# Patient Record
Sex: MALE | Race: WHITE | NOT HISPANIC OR LATINO
[De-identification: names, ages, dates, MRNs, and addresses within clinical notes are randomized per-mention and may not be internally consistent; named-entity substitution may affect disease eponyms.]

---

## 2017-02-27 ENCOUNTER — NON-APPOINTMENT (OUTPATIENT)
Age: 57
End: 2017-02-27

## 2017-02-27 ENCOUNTER — APPOINTMENT (OUTPATIENT)
Dept: CARDIOLOGY | Facility: CLINIC | Age: 57
End: 2017-02-27

## 2017-02-27 VITALS
OXYGEN SATURATION: 98 % | HEIGHT: 67 IN | DIASTOLIC BLOOD PRESSURE: 86 MMHG | HEART RATE: 95 BPM | RESPIRATION RATE: 17 BRPM | SYSTOLIC BLOOD PRESSURE: 124 MMHG | WEIGHT: 161 LBS | BODY MASS INDEX: 25.27 KG/M2

## 2017-02-27 VITALS — DIASTOLIC BLOOD PRESSURE: 90 MMHG | SYSTOLIC BLOOD PRESSURE: 120 MMHG

## 2017-05-31 ENCOUNTER — APPOINTMENT (OUTPATIENT)
Dept: CARDIOLOGY | Facility: CLINIC | Age: 57
End: 2017-05-31

## 2017-05-31 ENCOUNTER — NON-APPOINTMENT (OUTPATIENT)
Age: 57
End: 2017-05-31

## 2017-05-31 VITALS
RESPIRATION RATE: 16 BRPM | SYSTOLIC BLOOD PRESSURE: 129 MMHG | OXYGEN SATURATION: 99 % | HEART RATE: 62 BPM | BODY MASS INDEX: 25.43 KG/M2 | DIASTOLIC BLOOD PRESSURE: 85 MMHG | HEIGHT: 67 IN | WEIGHT: 162 LBS

## 2017-09-27 ENCOUNTER — NON-APPOINTMENT (OUTPATIENT)
Age: 57
End: 2017-09-27

## 2017-09-27 ENCOUNTER — APPOINTMENT (OUTPATIENT)
Dept: CARDIOLOGY | Facility: CLINIC | Age: 57
End: 2017-09-27
Payer: COMMERCIAL

## 2017-09-27 VITALS
SYSTOLIC BLOOD PRESSURE: 103 MMHG | RESPIRATION RATE: 17 BRPM | HEART RATE: 86 BPM | WEIGHT: 162 LBS | HEIGHT: 67 IN | OXYGEN SATURATION: 98 % | DIASTOLIC BLOOD PRESSURE: 74 MMHG | BODY MASS INDEX: 25.43 KG/M2

## 2017-09-27 PROCEDURE — 99215 OFFICE O/P EST HI 40 MIN: CPT

## 2017-09-27 PROCEDURE — 93000 ELECTROCARDIOGRAM COMPLETE: CPT

## 2017-09-27 PROCEDURE — 99214 OFFICE O/P EST MOD 30 MIN: CPT

## 2017-10-23 ENCOUNTER — OUTPATIENT (OUTPATIENT)
Dept: OUTPATIENT SERVICES | Facility: HOSPITAL | Age: 57
LOS: 1 days | Discharge: HOME | End: 2017-10-23
Payer: COMMERCIAL

## 2017-10-23 VITALS
OXYGEN SATURATION: 100 % | HEIGHT: 67 IN | TEMPERATURE: 98 F | RESPIRATION RATE: 16 BRPM | WEIGHT: 160.06 LBS | HEART RATE: 67 BPM | DIASTOLIC BLOOD PRESSURE: 88 MMHG | SYSTOLIC BLOOD PRESSURE: 130 MMHG

## 2017-10-23 DIAGNOSIS — Z95.2 PRESENCE OF PROSTHETIC HEART VALVE: Chronic | ICD-10-CM

## 2017-10-23 DIAGNOSIS — I35.0 NONRHEUMATIC AORTIC (VALVE) STENOSIS: ICD-10-CM

## 2017-10-23 LAB
ALBUMIN SERPL ELPH-MCNC: 4.5 G/DL — SIGNIFICANT CHANGE UP (ref 3.3–5)
ALP SERPL-CCNC: 79 U/L — SIGNIFICANT CHANGE UP (ref 40–120)
ALT FLD-CCNC: 35 U/L RC — SIGNIFICANT CHANGE UP (ref 10–45)
ANION GAP SERPL CALC-SCNC: 12 MMOL/L — SIGNIFICANT CHANGE UP (ref 5–17)
AST SERPL-CCNC: 23 U/L — SIGNIFICANT CHANGE UP (ref 10–40)
BILIRUB SERPL-MCNC: 0.7 MG/DL — SIGNIFICANT CHANGE UP (ref 0.2–1.2)
BUN SERPL-MCNC: 21 MG/DL — SIGNIFICANT CHANGE UP (ref 7–23)
CALCIUM SERPL-MCNC: 9 MG/DL — SIGNIFICANT CHANGE UP (ref 8.4–10.5)
CHLORIDE SERPL-SCNC: 103 MMOL/L — SIGNIFICANT CHANGE UP (ref 96–108)
CO2 SERPL-SCNC: 29 MMOL/L — SIGNIFICANT CHANGE UP (ref 22–31)
CREAT SERPL-MCNC: 1.25 MG/DL — SIGNIFICANT CHANGE UP (ref 0.5–1.3)
GLUCOSE SERPL-MCNC: 105 MG/DL — HIGH (ref 70–99)
HCT VFR BLD CALC: 42.4 % — SIGNIFICANT CHANGE UP (ref 39–50)
HGB BLD-MCNC: 14.6 G/DL — SIGNIFICANT CHANGE UP (ref 13–17)
MCHC RBC-ENTMCNC: 32.8 PG — SIGNIFICANT CHANGE UP (ref 27–34)
MCHC RBC-ENTMCNC: 34.4 GM/DL — SIGNIFICANT CHANGE UP (ref 32–36)
MCV RBC AUTO: 95.5 FL — SIGNIFICANT CHANGE UP (ref 80–100)
PLATELET # BLD AUTO: 259 K/UL — SIGNIFICANT CHANGE UP (ref 150–400)
POTASSIUM SERPL-MCNC: 4.6 MMOL/L — SIGNIFICANT CHANGE UP (ref 3.5–5.3)
POTASSIUM SERPL-SCNC: 4.6 MMOL/L — SIGNIFICANT CHANGE UP (ref 3.5–5.3)
PROT SERPL-MCNC: 7.3 G/DL — SIGNIFICANT CHANGE UP (ref 6–8.3)
RBC # BLD: 4.45 M/UL — SIGNIFICANT CHANGE UP (ref 4.2–5.8)
RBC # FLD: 11 % — SIGNIFICANT CHANGE UP (ref 10.3–14.5)
SODIUM SERPL-SCNC: 144 MMOL/L — SIGNIFICANT CHANGE UP (ref 135–145)
WBC # BLD: 6.8 K/UL — SIGNIFICANT CHANGE UP (ref 3.8–10.5)
WBC # FLD AUTO: 6.8 K/UL — SIGNIFICANT CHANGE UP (ref 3.8–10.5)

## 2017-10-23 PROCEDURE — C1887: CPT

## 2017-10-23 PROCEDURE — 93461 R&L HRT ART/VENTRICLE ANGIO: CPT

## 2017-10-23 PROCEDURE — 93567 NJX CAR CTH SPRVLV AORTGRPHY: CPT

## 2017-10-23 PROCEDURE — 93010 ELECTROCARDIOGRAM REPORT: CPT

## 2017-10-23 PROCEDURE — C1769: CPT

## 2017-10-23 PROCEDURE — 93460 R&L HRT ART/VENTRICLE ANGIO: CPT | Mod: 26

## 2017-10-23 PROCEDURE — 93460 R&L HRT ART/VENTRICLE ANGIO: CPT

## 2017-10-23 PROCEDURE — C1894: CPT

## 2017-10-23 PROCEDURE — 93005 ELECTROCARDIOGRAM TRACING: CPT

## 2017-10-23 PROCEDURE — 85027 COMPLETE CBC AUTOMATED: CPT

## 2017-10-23 PROCEDURE — 80053 COMPREHEN METABOLIC PANEL: CPT

## 2017-10-23 RX ORDER — SODIUM CHLORIDE 9 MG/ML
3 INJECTION INTRAMUSCULAR; INTRAVENOUS; SUBCUTANEOUS EVERY 8 HOURS
Qty: 0 | Refills: 0 | Status: DISCONTINUED | OUTPATIENT
Start: 2017-10-23 | End: 2017-11-10

## 2017-10-23 NOTE — H&P CARDIOLOGY - HISTORY OF PRESENT ILLNESS
57 yr old with PMH of HTN, HLD and known AS presents today for R+L heart cath. Patient reports LOCK for the past 2 years. Had follow up w Dr Sanon where ECHO was performed showing 57 yr old with PMH of HTN, HLD and known AS presents today for R+L heart cath. Patient reports LOCK for the past 2 years. No dizziness or chest pain.  Had follow up w Dr Sanon where ECHO was performed showing moderate to severe AS (as reported by pt). Referred today for angiogram

## 2017-10-30 ENCOUNTER — APPOINTMENT (OUTPATIENT)
Dept: CARDIOLOGY | Facility: CLINIC | Age: 57
End: 2017-10-30

## 2017-11-06 ENCOUNTER — APPOINTMENT (OUTPATIENT)
Dept: CARDIOLOGY | Facility: CLINIC | Age: 57
End: 2017-11-06
Payer: COMMERCIAL

## 2017-11-06 ENCOUNTER — NON-APPOINTMENT (OUTPATIENT)
Age: 57
End: 2017-11-06

## 2017-11-06 ENCOUNTER — APPOINTMENT (OUTPATIENT)
Dept: CARDIOTHORACIC SURGERY | Facility: CLINIC | Age: 57
End: 2017-11-06
Payer: COMMERCIAL

## 2017-11-06 VITALS
BODY MASS INDEX: 25.11 KG/M2 | RESPIRATION RATE: 17 BRPM | OXYGEN SATURATION: 93 % | HEART RATE: 83 BPM | HEIGHT: 67 IN | DIASTOLIC BLOOD PRESSURE: 77 MMHG | SYSTOLIC BLOOD PRESSURE: 107 MMHG | WEIGHT: 160 LBS

## 2017-11-06 DIAGNOSIS — R42 DIZZINESS AND GIDDINESS: ICD-10-CM

## 2017-11-06 PROCEDURE — 93000 ELECTROCARDIOGRAM COMPLETE: CPT

## 2017-11-06 PROCEDURE — 99215 OFFICE O/P EST HI 40 MIN: CPT

## 2017-11-14 ENCOUNTER — APPOINTMENT (OUTPATIENT)
Dept: CARDIOTHORACIC SURGERY | Facility: CLINIC | Age: 57
End: 2017-11-14
Payer: COMMERCIAL

## 2017-11-14 VITALS — DIASTOLIC BLOOD PRESSURE: 83 MMHG | SYSTOLIC BLOOD PRESSURE: 126 MMHG

## 2017-11-14 VITALS
DIASTOLIC BLOOD PRESSURE: 78 MMHG | RESPIRATION RATE: 15 BRPM | HEIGHT: 67 IN | SYSTOLIC BLOOD PRESSURE: 126 MMHG | WEIGHT: 162 LBS | OXYGEN SATURATION: 98 % | BODY MASS INDEX: 25.43 KG/M2 | TEMPERATURE: 98 F | HEART RATE: 71 BPM

## 2017-11-14 DIAGNOSIS — R01.1 CARDIAC MURMUR, UNSPECIFIED: ICD-10-CM

## 2017-11-14 DIAGNOSIS — Z71.9 COUNSELING, UNSPECIFIED: ICD-10-CM

## 2017-11-14 DIAGNOSIS — E78.5 HYPERLIPIDEMIA, UNSPECIFIED: ICD-10-CM

## 2017-11-14 PROCEDURE — 99244 OFF/OP CNSLTJ NEW/EST MOD 40: CPT

## 2017-11-14 RX ORDER — AMLODIPINE BESYLATE 2.5 MG/1
2.5 TABLET ORAL
Refills: 0 | Status: COMPLETED | COMMUNITY
End: 2017-11-14

## 2017-11-15 ENCOUNTER — APPOINTMENT (OUTPATIENT)
Dept: CARDIOTHORACIC SURGERY | Facility: CLINIC | Age: 57
End: 2017-11-15
Payer: COMMERCIAL

## 2018-01-03 ENCOUNTER — OUTPATIENT (OUTPATIENT)
Dept: OUTPATIENT SERVICES | Facility: HOSPITAL | Age: 58
LOS: 1 days | End: 2018-01-03
Payer: COMMERCIAL

## 2018-01-03 VITALS
HEIGHT: 67 IN | RESPIRATION RATE: 16 BRPM | TEMPERATURE: 98 F | SYSTOLIC BLOOD PRESSURE: 130 MMHG | OXYGEN SATURATION: 97 % | HEART RATE: 80 BPM | WEIGHT: 163.58 LBS | DIASTOLIC BLOOD PRESSURE: 90 MMHG

## 2018-01-03 DIAGNOSIS — Z01.818 ENCOUNTER FOR OTHER PREPROCEDURAL EXAMINATION: ICD-10-CM

## 2018-01-03 DIAGNOSIS — I10 ESSENTIAL (PRIMARY) HYPERTENSION: ICD-10-CM

## 2018-01-03 DIAGNOSIS — I35.0 NONRHEUMATIC AORTIC (VALVE) STENOSIS: ICD-10-CM

## 2018-01-03 DIAGNOSIS — Z95.2 PRESENCE OF PROSTHETIC HEART VALVE: Chronic | ICD-10-CM

## 2018-01-03 LAB
ANION GAP SERPL CALC-SCNC: 22 MMOL/L — HIGH (ref 5–17)
BLD GP AB SCN SERPL QL: NEGATIVE — SIGNIFICANT CHANGE UP
BUN SERPL-MCNC: 20 MG/DL — SIGNIFICANT CHANGE UP (ref 7–23)
CALCIUM SERPL-MCNC: 10.2 MG/DL — SIGNIFICANT CHANGE UP (ref 8.4–10.5)
CHLORIDE SERPL-SCNC: 104 MMOL/L — SIGNIFICANT CHANGE UP (ref 96–108)
CO2 SERPL-SCNC: 21 MMOL/L — LOW (ref 22–31)
CREAT SERPL-MCNC: 1.25 MG/DL — SIGNIFICANT CHANGE UP (ref 0.5–1.3)
GLUCOSE SERPL-MCNC: 107 MG/DL — HIGH (ref 70–99)
HBA1C BLD-MCNC: 5.3 % — SIGNIFICANT CHANGE UP (ref 4–5.6)
HCT VFR BLD CALC: 48 % — SIGNIFICANT CHANGE UP (ref 39–50)
HGB BLD-MCNC: 15.3 G/DL — SIGNIFICANT CHANGE UP (ref 13–17)
MCHC RBC-ENTMCNC: 30.4 PG — SIGNIFICANT CHANGE UP (ref 27–34)
MCHC RBC-ENTMCNC: 31.9 GM/DL — LOW (ref 32–36)
MCV RBC AUTO: 95.4 FL — SIGNIFICANT CHANGE UP (ref 80–100)
MRSA PCR RESULT.: SIGNIFICANT CHANGE UP
PLATELET # BLD AUTO: 294 K/UL — SIGNIFICANT CHANGE UP (ref 150–400)
POTASSIUM SERPL-MCNC: 4.6 MMOL/L — SIGNIFICANT CHANGE UP (ref 3.5–5.3)
POTASSIUM SERPL-SCNC: 4.6 MMOL/L — SIGNIFICANT CHANGE UP (ref 3.5–5.3)
RBC # BLD: 5.03 M/UL — SIGNIFICANT CHANGE UP (ref 4.2–5.8)
RBC # FLD: 12.4 % — SIGNIFICANT CHANGE UP (ref 10.3–14.5)
RH IG SCN BLD-IMP: POSITIVE — SIGNIFICANT CHANGE UP
S AUREUS DNA NOSE QL NAA+PROBE: DETECTED
SODIUM SERPL-SCNC: 147 MMOL/L — HIGH (ref 135–145)
WBC # BLD: 6.37 K/UL — SIGNIFICANT CHANGE UP (ref 3.8–10.5)
WBC # FLD AUTO: 6.37 K/UL — SIGNIFICANT CHANGE UP (ref 3.8–10.5)

## 2018-01-03 PROCEDURE — 71046 X-RAY EXAM CHEST 2 VIEWS: CPT | Mod: 26

## 2018-01-03 PROCEDURE — 86850 RBC ANTIBODY SCREEN: CPT

## 2018-01-03 PROCEDURE — 86901 BLOOD TYPING SEROLOGIC RH(D): CPT

## 2018-01-03 PROCEDURE — 87641 MR-STAPH DNA AMP PROBE: CPT

## 2018-01-03 PROCEDURE — 86900 BLOOD TYPING SEROLOGIC ABO: CPT

## 2018-01-03 PROCEDURE — 83036 HEMOGLOBIN GLYCOSYLATED A1C: CPT

## 2018-01-03 PROCEDURE — 71046 X-RAY EXAM CHEST 2 VIEWS: CPT

## 2018-01-03 PROCEDURE — 80048 BASIC METABOLIC PNL TOTAL CA: CPT

## 2018-01-03 PROCEDURE — 85027 COMPLETE CBC AUTOMATED: CPT

## 2018-01-03 PROCEDURE — 87640 STAPH A DNA AMP PROBE: CPT

## 2018-01-03 PROCEDURE — G0463: CPT

## 2018-01-03 RX ORDER — CEFUROXIME AXETIL 250 MG
1500 TABLET ORAL ONCE
Qty: 0 | Refills: 0 | Status: COMPLETED | OUTPATIENT
Start: 2018-01-18 | End: 2018-01-18

## 2018-01-03 RX ORDER — AMLODIPINE BESYLATE 2.5 MG/1
1 TABLET ORAL
Qty: 0 | Refills: 0 | COMMUNITY

## 2018-01-03 RX ORDER — SODIUM CHLORIDE 9 MG/ML
3 INJECTION INTRAMUSCULAR; INTRAVENOUS; SUBCUTANEOUS EVERY 8 HOURS
Qty: 0 | Refills: 0 | Status: DISCONTINUED | OUTPATIENT
Start: 2018-01-18 | End: 2018-01-18

## 2018-01-03 NOTE — H&P PST ADULT - HISTORY OF PRESENT ILLNESS
57 yr old with PMH of HTN, HLD and known AS presents today for R+L heart cath. Patient reports LOCK for the past 2 years. No dizziness or chest pain.  Had follow up w Dr Sanon where ECHO was performed showing moderate to severe AS (as reported by pt). Referred today for angiogram 58 yo male, PMH heart murmur for several years, last Echocardiogram revealed moderate to severe AS, pt. presents to PST today for scheduled Aortic Valve Replacement.   57 yr old with PMH of HTN, HLD and known AS presents today for R+L heart cath. Patient reports LOCK for the past 2 years. No dizziness or chest pain.  Had follow up w Dr Sanon where ECHO was performed showing moderate to severe AS (as reported by pt). Referred today for angiogram 56 yo male, PMH heart murmur for several years, last Echocardiogram revealed calcified aortic valve with decreased opening, pt. presents to PST today for scheduled Aortic Valve Replacement on 1/11/18. Pt. denies SOB, LOCK, chest pain, palpitations, changes in bowel/urinary habits.

## 2018-01-03 NOTE — H&P PST ADULT - PROBLEM SELECTOR PLAN 1
Aortic Valve Replacement  Pre-op education, including Chlorhexidine soap, provided, all questions answered

## 2018-01-03 NOTE — H&P PST ADULT - NSANTHOSAYNRD_GEN_A_CORE
No. RONDA screening performed.  STOP BANG Legend: 0-2 = LOW Risk; 3-4 = INTERMEDIATE Risk; 5-8 = HIGH Risk

## 2018-01-03 NOTE — H&P PST ADULT - PMH
AS (aortic stenosis)    HLD (hyperlipidemia)    HTN (hypertension) AS (aortic stenosis)  severe  HTN (hypertension) AS (aortic stenosis)  mod-severe  HTN (hypertension)

## 2018-01-08 ENCOUNTER — TRANSCRIPTION ENCOUNTER (OUTPATIENT)
Age: 58
End: 2018-01-08

## 2018-01-18 ENCOUNTER — APPOINTMENT (OUTPATIENT)
Dept: CARDIOTHORACIC SURGERY | Facility: HOSPITAL | Age: 58
End: 2018-01-18
Payer: COMMERCIAL

## 2018-01-18 ENCOUNTER — INPATIENT (INPATIENT)
Facility: HOSPITAL | Age: 58
LOS: 3 days | Discharge: HOME | DRG: 219 | End: 2018-01-22
Attending: THORACIC SURGERY (CARDIOTHORACIC VASCULAR SURGERY) | Admitting: THORACIC SURGERY (CARDIOTHORACIC VASCULAR SURGERY)
Payer: COMMERCIAL

## 2018-01-18 ENCOUNTER — RESULT REVIEW (OUTPATIENT)
Age: 58
End: 2018-01-18

## 2018-01-18 VITALS
WEIGHT: 160.28 LBS | OXYGEN SATURATION: 98 % | TEMPERATURE: 98 F | DIASTOLIC BLOOD PRESSURE: 84 MMHG | HEIGHT: 67 IN | HEART RATE: 86 BPM | RESPIRATION RATE: 18 BRPM | SYSTOLIC BLOOD PRESSURE: 131 MMHG

## 2018-01-18 DIAGNOSIS — Z95.2 PRESENCE OF PROSTHETIC HEART VALVE: Chronic | ICD-10-CM

## 2018-01-18 DIAGNOSIS — I35.0 NONRHEUMATIC AORTIC (VALVE) STENOSIS: ICD-10-CM

## 2018-01-18 LAB
ANION GAP SERPL CALC-SCNC: 13 MMOL/L — SIGNIFICANT CHANGE UP (ref 5–17)
APTT BLD: 26.8 SEC — LOW (ref 27.5–37.4)
BASE EXCESS BLDV CALC-SCNC: 0.5 MMOL/L — SIGNIFICANT CHANGE UP (ref -2–2)
BASE EXCESS BLDV CALC-SCNC: 0.7 MMOL/L — SIGNIFICANT CHANGE UP (ref -2–2)
BASOPHILS # BLD AUTO: 0.1 K/UL — SIGNIFICANT CHANGE UP (ref 0–0.2)
BASOPHILS NFR BLD AUTO: 0.4 % — SIGNIFICANT CHANGE UP (ref 0–2)
BLOOD GAS VENOUS - CREATININE: SIGNIFICANT CHANGE UP MG/DL (ref 0.5–1.3)
BLOOD GAS VENOUS - CREATININE: SIGNIFICANT CHANGE UP MG/DL (ref 0.5–1.3)
BUN SERPL-MCNC: 16 MG/DL — SIGNIFICANT CHANGE UP (ref 7–23)
CA-I SERPL-SCNC: 0.9 MMOL/L — LOW (ref 1.12–1.3)
CA-I SERPL-SCNC: 0.93 MMOL/L — LOW (ref 1.12–1.3)
CALCIUM SERPL-MCNC: 7.6 MG/DL — LOW (ref 8.4–10.5)
CHLORIDE BLDV-SCNC: SIGNIFICANT CHANGE UP MMOL/L (ref 96–108)
CHLORIDE BLDV-SCNC: SIGNIFICANT CHANGE UP MMOL/L (ref 96–108)
CHLORIDE SERPL-SCNC: 105 MMOL/L — SIGNIFICANT CHANGE UP (ref 96–108)
CK MB BLD-MCNC: 13.7 % — HIGH (ref 0–3.5)
CK MB CFR SERPL CALC: 45 NG/ML — HIGH (ref 0–6.7)
CK SERPL-CCNC: 329 U/L — HIGH (ref 30–200)
CO2 SERPL-SCNC: 23 MMOL/L — SIGNIFICANT CHANGE UP (ref 22–31)
CREAT SERPL-MCNC: 1.01 MG/DL — SIGNIFICANT CHANGE UP (ref 0.5–1.3)
EOSINOPHIL # BLD AUTO: 0.2 K/UL — SIGNIFICANT CHANGE UP (ref 0–0.5)
EOSINOPHIL NFR BLD AUTO: 1.2 % — SIGNIFICANT CHANGE UP (ref 0–6)
GAS PNL BLDA: SIGNIFICANT CHANGE UP
GAS PNL BLDV: 137 MMOL/L — SIGNIFICANT CHANGE UP (ref 136–145)
GAS PNL BLDV: 137 MMOL/L — SIGNIFICANT CHANGE UP (ref 136–145)
GAS PNL BLDV: SIGNIFICANT CHANGE UP
GLUCOSE BLDV-MCNC: 121 MG/DL — HIGH (ref 70–99)
GLUCOSE BLDV-MCNC: 129 MG/DL — HIGH (ref 70–99)
GLUCOSE SERPL-MCNC: 129 MG/DL — HIGH (ref 70–99)
HCO3 BLDV-SCNC: 25 MMOL/L — SIGNIFICANT CHANGE UP (ref 21–29)
HCO3 BLDV-SCNC: 25 MMOL/L — SIGNIFICANT CHANGE UP (ref 21–29)
HCT VFR BLD CALC: 36.3 % — LOW (ref 39–50)
HCT VFR BLDA CALC: 26 % — LOW (ref 39–50)
HCT VFR BLDA CALC: 29 % — LOW (ref 39–50)
HGB BLD CALC-MCNC: 8.2 G/DL — LOW (ref 13–17)
HGB BLD CALC-MCNC: 9.3 G/DL — LOW (ref 13–17)
HGB BLD-MCNC: 12.7 G/DL — LOW (ref 13–17)
HOROWITZ INDEX BLDV+IHG-RTO: 0 — SIGNIFICANT CHANGE UP
HOROWITZ INDEX BLDV+IHG-RTO: 0 — SIGNIFICANT CHANGE UP
INR BLD: 1.18 RATIO — HIGH (ref 0.88–1.16)
LACTATE BLDV-MCNC: 1.3 MMOL/L — SIGNIFICANT CHANGE UP (ref 0.7–2)
LACTATE BLDV-MCNC: 1.8 MMOL/L — SIGNIFICANT CHANGE UP (ref 0.7–2)
LYMPHOCYTES # BLD AUTO: 1.9 K/UL — SIGNIFICANT CHANGE UP (ref 1–3.3)
LYMPHOCYTES # BLD AUTO: 13.8 % — SIGNIFICANT CHANGE UP (ref 13–44)
MCHC RBC-ENTMCNC: 32.4 PG — SIGNIFICANT CHANGE UP (ref 27–34)
MCHC RBC-ENTMCNC: 35 GM/DL — SIGNIFICANT CHANGE UP (ref 32–36)
MCV RBC AUTO: 92.7 FL — SIGNIFICANT CHANGE UP (ref 80–100)
MONOCYTES # BLD AUTO: 0.7 K/UL — SIGNIFICANT CHANGE UP (ref 0–0.9)
MONOCYTES NFR BLD AUTO: 5 % — SIGNIFICANT CHANGE UP (ref 2–14)
NEUTROPHILS # BLD AUTO: 11.2 K/UL — HIGH (ref 1.8–7.4)
NEUTROPHILS NFR BLD AUTO: 79.7 % — HIGH (ref 43–77)
PCO2 BLDV: 42 MMHG — SIGNIFICANT CHANGE UP (ref 35–50)
PCO2 BLDV: 42 MMHG — SIGNIFICANT CHANGE UP (ref 35–50)
PH BLDV: 7.39 — SIGNIFICANT CHANGE UP (ref 7.35–7.45)
PH BLDV: 7.39 — SIGNIFICANT CHANGE UP (ref 7.35–7.45)
PLATELET # BLD AUTO: 144 K/UL — LOW (ref 150–400)
PO2 BLDV: 75 MMHG — HIGH (ref 25–45)
PO2 BLDV: 78 MMHG — HIGH (ref 25–45)
POTASSIUM BLDV-SCNC: 6.1 MMOL/L — HIGH (ref 3.5–5)
POTASSIUM BLDV-SCNC: 6.2 MMOL/L — CRITICAL HIGH (ref 3.5–5)
POTASSIUM SERPL-MCNC: 4.2 MMOL/L — SIGNIFICANT CHANGE UP (ref 3.5–5.3)
POTASSIUM SERPL-SCNC: 4.2 MMOL/L — SIGNIFICANT CHANGE UP (ref 3.5–5.3)
PROTHROM AB SERPL-ACNC: 12.9 SEC — HIGH (ref 9.8–12.7)
RBC # BLD: 3.92 M/UL — LOW (ref 4.2–5.8)
RBC # FLD: 10.9 % — SIGNIFICANT CHANGE UP (ref 10.3–14.5)
RH IG SCN BLD-IMP: POSITIVE — SIGNIFICANT CHANGE UP
SAO2 % BLDV: 96 % — HIGH (ref 67–88)
SAO2 % BLDV: 96 % — HIGH (ref 67–88)
SODIUM SERPL-SCNC: 141 MMOL/L — SIGNIFICANT CHANGE UP (ref 135–145)
TROPONIN T SERPL-MCNC: 0.61 NG/ML — HIGH (ref 0–0.06)
WBC # BLD: 14 K/UL — HIGH (ref 3.8–10.5)
WBC # FLD AUTO: 14 K/UL — HIGH (ref 3.8–10.5)

## 2018-01-18 PROCEDURE — 88311 DECALCIFY TISSUE: CPT | Mod: 26

## 2018-01-18 PROCEDURE — 33405 REPLACEMENT AORTIC VALVE OPN: CPT | Mod: AS

## 2018-01-18 PROCEDURE — 71045 X-RAY EXAM CHEST 1 VIEW: CPT | Mod: 26

## 2018-01-18 PROCEDURE — 33405 REPLACEMENT AORTIC VALVE OPN: CPT

## 2018-01-18 PROCEDURE — 88305 TISSUE EXAM BY PATHOLOGIST: CPT | Mod: 26

## 2018-01-18 PROCEDURE — 93010 ELECTROCARDIOGRAM REPORT: CPT

## 2018-01-18 RX ORDER — DEXTROSE 50 % IN WATER 50 %
25 SYRINGE (ML) INTRAVENOUS
Qty: 0 | Refills: 0 | Status: DISCONTINUED | OUTPATIENT
Start: 2018-01-18 | End: 2018-01-21

## 2018-01-18 RX ORDER — ACETAMINOPHEN 500 MG
1000 TABLET ORAL ONCE
Qty: 0 | Refills: 0 | Status: COMPLETED | OUTPATIENT
Start: 2018-01-18 | End: 2018-01-18

## 2018-01-18 RX ORDER — LIDOCAINE HCL 20 MG/ML
0.2 VIAL (ML) INJECTION ONCE
Qty: 0 | Refills: 0 | Status: COMPLETED | OUTPATIENT
Start: 2018-01-18 | End: 2018-01-18

## 2018-01-18 RX ORDER — POTASSIUM CHLORIDE 20 MEQ
10 PACKET (EA) ORAL
Qty: 0 | Refills: 0 | Status: COMPLETED | OUTPATIENT
Start: 2018-01-18 | End: 2018-01-18

## 2018-01-18 RX ORDER — SODIUM CHLORIDE 9 MG/ML
750 INJECTION, SOLUTION INTRAVENOUS
Qty: 0 | Refills: 0 | Status: COMPLETED | OUTPATIENT
Start: 2018-01-18 | End: 2018-01-18

## 2018-01-18 RX ORDER — CEFUROXIME AXETIL 250 MG
1500 TABLET ORAL EVERY 8 HOURS
Qty: 0 | Refills: 0 | Status: COMPLETED | OUTPATIENT
Start: 2018-01-18 | End: 2018-01-20

## 2018-01-18 RX ORDER — POTASSIUM CHLORIDE 20 MEQ
10 PACKET (EA) ORAL
Qty: 0 | Refills: 0 | Status: DISCONTINUED | OUTPATIENT
Start: 2018-01-18 | End: 2018-01-19

## 2018-01-18 RX ORDER — VANCOMYCIN HCL 1 G
1000 VIAL (EA) INTRAVENOUS ONCE
Qty: 0 | Refills: 0 | Status: COMPLETED | OUTPATIENT
Start: 2018-01-18 | End: 2018-01-18

## 2018-01-18 RX ORDER — DEXMEDETOMIDINE HYDROCHLORIDE IN 0.9% SODIUM CHLORIDE 4 UG/ML
0.2 INJECTION INTRAVENOUS
Qty: 200 | Refills: 0 | Status: DISCONTINUED | OUTPATIENT
Start: 2018-01-18 | End: 2018-01-19

## 2018-01-18 RX ORDER — POTASSIUM CHLORIDE 20 MEQ
10 PACKET (EA) ORAL
Qty: 0 | Refills: 0 | Status: COMPLETED | OUTPATIENT
Start: 2018-01-18 | End: 2018-01-19

## 2018-01-18 RX ORDER — DOCUSATE SODIUM 100 MG
100 CAPSULE ORAL THREE TIMES A DAY
Qty: 0 | Refills: 0 | Status: DISCONTINUED | OUTPATIENT
Start: 2018-01-18 | End: 2018-01-22

## 2018-01-18 RX ORDER — INSULIN HUMAN 100 [IU]/ML
3 INJECTION, SOLUTION SUBCUTANEOUS
Qty: 100 | Refills: 0 | Status: DISCONTINUED | OUTPATIENT
Start: 2018-01-18 | End: 2018-01-19

## 2018-01-18 RX ORDER — POTASSIUM CHLORIDE 20 MEQ
10 PACKET (EA) ORAL ONCE
Qty: 0 | Refills: 0 | Status: DISCONTINUED | OUTPATIENT
Start: 2018-01-18 | End: 2018-01-19

## 2018-01-18 RX ORDER — NOREPINEPHRINE BITARTRATE/D5W 8 MG/250ML
0.05 PLASTIC BAG, INJECTION (ML) INTRAVENOUS
Qty: 8 | Refills: 0 | Status: DISCONTINUED | OUTPATIENT
Start: 2018-01-18 | End: 2018-01-19

## 2018-01-18 RX ORDER — VASOPRESSIN 20 [USP'U]/ML
0.04 INJECTION INTRAVENOUS
Qty: 100 | Refills: 0 | Status: DISCONTINUED | OUTPATIENT
Start: 2018-01-18 | End: 2018-01-20

## 2018-01-18 RX ORDER — METOCLOPRAMIDE HCL 10 MG
10 TABLET ORAL EVERY 8 HOURS
Qty: 0 | Refills: 0 | Status: COMPLETED | OUTPATIENT
Start: 2018-01-18 | End: 2018-01-20

## 2018-01-18 RX ORDER — DEXTROSE 50 % IN WATER 50 %
50 SYRINGE (ML) INTRAVENOUS
Qty: 0 | Refills: 0 | Status: DISCONTINUED | OUTPATIENT
Start: 2018-01-18 | End: 2018-01-21

## 2018-01-18 RX ORDER — PROPOFOL 10 MG/ML
11.46 INJECTION, EMULSION INTRAVENOUS
Qty: 500 | Refills: 0 | Status: DISCONTINUED | OUTPATIENT
Start: 2018-01-18 | End: 2018-01-19

## 2018-01-18 RX ORDER — SODIUM CHLORIDE 9 MG/ML
1000 INJECTION, SOLUTION INTRAVENOUS
Qty: 0 | Refills: 0 | Status: DISCONTINUED | OUTPATIENT
Start: 2018-01-18 | End: 2018-01-19

## 2018-01-18 RX ORDER — MEPERIDINE HYDROCHLORIDE 50 MG/ML
25 INJECTION INTRAMUSCULAR; INTRAVENOUS; SUBCUTANEOUS ONCE
Qty: 0 | Refills: 0 | Status: DISCONTINUED | OUTPATIENT
Start: 2018-01-18 | End: 2018-01-18

## 2018-01-18 RX ORDER — PANTOPRAZOLE SODIUM 20 MG/1
40 TABLET, DELAYED RELEASE ORAL DAILY
Qty: 0 | Refills: 0 | Status: DISCONTINUED | OUTPATIENT
Start: 2018-01-18 | End: 2018-01-19

## 2018-01-18 RX ORDER — ALBUMIN HUMAN 25 %
250 VIAL (ML) INTRAVENOUS ONCE
Qty: 0 | Refills: 0 | Status: COMPLETED | OUTPATIENT
Start: 2018-01-18 | End: 2018-01-18

## 2018-01-18 RX ORDER — HYDROMORPHONE HYDROCHLORIDE 2 MG/ML
0.5 INJECTION INTRAMUSCULAR; INTRAVENOUS; SUBCUTANEOUS ONCE
Qty: 0 | Refills: 0 | Status: DISCONTINUED | OUTPATIENT
Start: 2018-01-18 | End: 2018-01-18

## 2018-01-18 RX ADMIN — Medication 100 MILLIEQUIVALENT(S): at 22:39

## 2018-01-18 RX ADMIN — Medication 10 MILLIGRAM(S): at 21:21

## 2018-01-18 RX ADMIN — PANTOPRAZOLE SODIUM 40 MILLIGRAM(S): 20 TABLET, DELAYED RELEASE ORAL at 15:01

## 2018-01-18 RX ADMIN — Medication 125 MILLILITER(S): at 20:30

## 2018-01-18 RX ADMIN — Medication 100 MILLIGRAM(S): at 15:01

## 2018-01-18 RX ADMIN — Medication 100 MILLIEQUIVALENT(S): at 14:30

## 2018-01-18 RX ADMIN — Medication 0.2 MILLILITER(S): at 06:15

## 2018-01-18 RX ADMIN — Medication 100 MILLIEQUIVALENT(S): at 14:00

## 2018-01-18 RX ADMIN — HYDROMORPHONE HYDROCHLORIDE 0.5 MILLIGRAM(S): 2 INJECTION INTRAMUSCULAR; INTRAVENOUS; SUBCUTANEOUS at 16:45

## 2018-01-18 RX ADMIN — HYDROMORPHONE HYDROCHLORIDE 0.5 MILLIGRAM(S): 2 INJECTION INTRAMUSCULAR; INTRAVENOUS; SUBCUTANEOUS at 17:00

## 2018-01-18 RX ADMIN — Medication 400 MILLIGRAM(S): at 19:01

## 2018-01-18 RX ADMIN — Medication 250 MILLIGRAM(S): at 19:15

## 2018-01-18 RX ADMIN — Medication 10 MILLIGRAM(S): at 15:01

## 2018-01-18 RX ADMIN — SODIUM CHLORIDE 4500 MILLILITER(S): 9 INJECTION, SOLUTION INTRAVENOUS at 14:15

## 2018-01-18 NOTE — BRIEF OPERATIVE NOTE - POST-OP DX
Aortic valve insufficiency, etiology of cardiac valve disease unspecified  01/18/2018    Active  Alex Brandon

## 2018-01-18 NOTE — BRIEF OPERATIVE NOTE - PROCEDURE
<<-----Click on this checkbox to enter Procedure Aortic valve replacement  01/18/2018  Pericardial valve #  [facility]  [facility]

## 2018-01-19 LAB
ANION GAP SERPL CALC-SCNC: 8 MMOL/L — SIGNIFICANT CHANGE UP (ref 5–17)
BASE EXCESS BLDV CALC-SCNC: -1.9 MMOL/L — SIGNIFICANT CHANGE UP (ref -2–2)
BASOPHILS # BLD AUTO: 0 K/UL — SIGNIFICANT CHANGE UP (ref 0–0.2)
BASOPHILS NFR BLD AUTO: 0.3 % — SIGNIFICANT CHANGE UP (ref 0–2)
BUN SERPL-MCNC: 12 MG/DL — SIGNIFICANT CHANGE UP (ref 7–23)
CA-I SERPL-SCNC: 1.12 MMOL/L — SIGNIFICANT CHANGE UP (ref 1.12–1.3)
CALCIUM SERPL-MCNC: 7.5 MG/DL — LOW (ref 8.4–10.5)
CHLORIDE BLDV-SCNC: 109 MMOL/L — HIGH (ref 96–108)
CHLORIDE SERPL-SCNC: 108 MMOL/L — SIGNIFICANT CHANGE UP (ref 96–108)
CO2 BLDV-SCNC: 27 MMOL/L — SIGNIFICANT CHANGE UP (ref 22–30)
CO2 SERPL-SCNC: 26 MMOL/L — SIGNIFICANT CHANGE UP (ref 22–31)
CREAT SERPL-MCNC: 1.03 MG/DL — SIGNIFICANT CHANGE UP (ref 0.5–1.3)
EOSINOPHIL # BLD AUTO: 0 K/UL — SIGNIFICANT CHANGE UP (ref 0–0.5)
EOSINOPHIL NFR BLD AUTO: 0.2 % — SIGNIFICANT CHANGE UP (ref 0–6)
GAS PNL BLDA: SIGNIFICANT CHANGE UP
GAS PNL BLDV: 137 MMOL/L — SIGNIFICANT CHANGE UP (ref 136–145)
GAS PNL BLDV: SIGNIFICANT CHANGE UP
GLUCOSE BLDV-MCNC: 146 MG/DL — HIGH (ref 70–99)
GLUCOSE SERPL-MCNC: 138 MG/DL — HIGH (ref 70–99)
HCO3 BLDV-SCNC: 25 MMOL/L — SIGNIFICANT CHANGE UP (ref 21–29)
HCT VFR BLD CALC: 31 % — LOW (ref 39–50)
HCT VFR BLD CALC: 32.8 % — LOW (ref 39–50)
HCT VFR BLDA CALC: 33 % — LOW (ref 39–50)
HGB BLD CALC-MCNC: 10.7 G/DL — LOW (ref 13–17)
HGB BLD-MCNC: 10.5 G/DL — LOW (ref 13–17)
HGB BLD-MCNC: 10.7 G/DL — LOW (ref 13–17)
HOROWITZ INDEX BLDV+IHG-RTO: 36 — SIGNIFICANT CHANGE UP
LACTATE BLDV-MCNC: 1.9 MMOL/L — SIGNIFICANT CHANGE UP (ref 0.7–2)
LYMPHOCYTES # BLD AUTO: 0.9 K/UL — LOW (ref 1–3.3)
LYMPHOCYTES # BLD AUTO: 7.3 % — LOW (ref 13–44)
MCHC RBC-ENTMCNC: 31.3 PG — SIGNIFICANT CHANGE UP (ref 27–34)
MCHC RBC-ENTMCNC: 32.1 PG — SIGNIFICANT CHANGE UP (ref 27–34)
MCHC RBC-ENTMCNC: 32.5 GM/DL — SIGNIFICANT CHANGE UP (ref 32–36)
MCHC RBC-ENTMCNC: 33.8 GM/DL — SIGNIFICANT CHANGE UP (ref 32–36)
MCV RBC AUTO: 94.9 FL — SIGNIFICANT CHANGE UP (ref 80–100)
MCV RBC AUTO: 96.3 FL — SIGNIFICANT CHANGE UP (ref 80–100)
MONOCYTES # BLD AUTO: 0.9 K/UL — SIGNIFICANT CHANGE UP (ref 0–0.9)
MONOCYTES NFR BLD AUTO: 7.4 % — SIGNIFICANT CHANGE UP (ref 2–14)
NEUTROPHILS # BLD AUTO: 10.6 K/UL — HIGH (ref 1.8–7.4)
NEUTROPHILS NFR BLD AUTO: 84.8 % — HIGH (ref 43–77)
OTHER CELLS CSF MANUAL: 10 ML/DL — LOW (ref 18–22)
PCO2 BLDV: 57 MMHG — HIGH (ref 35–50)
PH BLDV: 7.27 — LOW (ref 7.35–7.45)
PLATELET # BLD AUTO: 112 K/UL — LOW (ref 150–400)
PLATELET # BLD AUTO: 136 K/UL — LOW (ref 150–400)
PO2 BLDV: 43 MMHG — SIGNIFICANT CHANGE UP (ref 25–45)
POTASSIUM BLDV-SCNC: 4.5 MMOL/L — SIGNIFICANT CHANGE UP (ref 3.5–5)
POTASSIUM SERPL-MCNC: 4.7 MMOL/L — SIGNIFICANT CHANGE UP (ref 3.5–5.3)
POTASSIUM SERPL-SCNC: 4.7 MMOL/L — SIGNIFICANT CHANGE UP (ref 3.5–5.3)
RBC # BLD: 3.27 M/UL — LOW (ref 4.2–5.8)
RBC # BLD: 3.41 M/UL — LOW (ref 4.2–5.8)
RBC # FLD: 11 % — SIGNIFICANT CHANGE UP (ref 10.3–14.5)
RBC # FLD: 11.3 % — SIGNIFICANT CHANGE UP (ref 10.3–14.5)
SAO2 % BLDV: 70 % — SIGNIFICANT CHANGE UP (ref 67–88)
SODIUM SERPL-SCNC: 142 MMOL/L — SIGNIFICANT CHANGE UP (ref 135–145)
TSH SERPL-MCNC: 1.89 UIU/ML — SIGNIFICANT CHANGE UP (ref 0.27–4.2)
WBC # BLD: 11.6 K/UL — HIGH (ref 3.8–10.5)
WBC # BLD: 12.5 K/UL — HIGH (ref 3.8–10.5)
WBC # FLD AUTO: 11.6 K/UL — HIGH (ref 3.8–10.5)
WBC # FLD AUTO: 12.5 K/UL — HIGH (ref 3.8–10.5)

## 2018-01-19 PROCEDURE — 93010 ELECTROCARDIOGRAM REPORT: CPT

## 2018-01-19 PROCEDURE — 71045 X-RAY EXAM CHEST 1 VIEW: CPT | Mod: 26

## 2018-01-19 PROCEDURE — 36620 INSERTION CATHETER ARTERY: CPT

## 2018-01-19 PROCEDURE — 99292 CRITICAL CARE ADDL 30 MIN: CPT

## 2018-01-19 PROCEDURE — 99291 CRITICAL CARE FIRST HOUR: CPT

## 2018-01-19 RX ORDER — HYDROMORPHONE HYDROCHLORIDE 2 MG/ML
0.5 INJECTION INTRAMUSCULAR; INTRAVENOUS; SUBCUTANEOUS ONCE
Qty: 0 | Refills: 0 | Status: DISCONTINUED | OUTPATIENT
Start: 2018-01-19 | End: 2018-01-19

## 2018-01-19 RX ORDER — HYDROMORPHONE HYDROCHLORIDE 2 MG/ML
1 INJECTION INTRAMUSCULAR; INTRAVENOUS; SUBCUTANEOUS ONCE
Qty: 0 | Refills: 0 | Status: DISCONTINUED | OUTPATIENT
Start: 2018-01-19 | End: 2018-01-19

## 2018-01-19 RX ORDER — KETOROLAC TROMETHAMINE 30 MG/ML
30 SYRINGE (ML) INJECTION EVERY 8 HOURS
Qty: 0 | Refills: 0 | Status: DISCONTINUED | OUTPATIENT
Start: 2018-01-19 | End: 2018-01-20

## 2018-01-19 RX ORDER — COLCHICINE 0.6 MG
0.6 TABLET ORAL
Qty: 0 | Refills: 0 | Status: DISCONTINUED | OUTPATIENT
Start: 2018-01-19 | End: 2018-01-22

## 2018-01-19 RX ORDER — ACETAMINOPHEN 500 MG
1000 TABLET ORAL ONCE
Qty: 0 | Refills: 0 | Status: COMPLETED | OUTPATIENT
Start: 2018-01-19 | End: 2018-01-19

## 2018-01-19 RX ORDER — ENOXAPARIN SODIUM 100 MG/ML
40 INJECTION SUBCUTANEOUS DAILY
Qty: 0 | Refills: 0 | Status: DISCONTINUED | OUTPATIENT
Start: 2018-01-19 | End: 2018-01-22

## 2018-01-19 RX ORDER — ASPIRIN/CALCIUM CARB/MAGNESIUM 324 MG
325 TABLET ORAL DAILY
Qty: 0 | Refills: 0 | Status: DISCONTINUED | OUTPATIENT
Start: 2018-01-19 | End: 2018-01-22

## 2018-01-19 RX ORDER — PANTOPRAZOLE SODIUM 20 MG/1
40 TABLET, DELAYED RELEASE ORAL
Qty: 0 | Refills: 0 | Status: DISCONTINUED | OUTPATIENT
Start: 2018-01-19 | End: 2018-01-22

## 2018-01-19 RX ORDER — ALBUMIN HUMAN 25 %
250 VIAL (ML) INTRAVENOUS ONCE
Qty: 0 | Refills: 0 | Status: COMPLETED | OUTPATIENT
Start: 2018-01-19 | End: 2018-01-19

## 2018-01-19 RX ORDER — KETOROLAC TROMETHAMINE 30 MG/ML
15 SYRINGE (ML) INJECTION EVERY 8 HOURS
Qty: 0 | Refills: 0 | Status: DISCONTINUED | OUTPATIENT
Start: 2018-01-19 | End: 2018-01-19

## 2018-01-19 RX ORDER — INSULIN LISPRO 100/ML
VIAL (ML) SUBCUTANEOUS
Qty: 0 | Refills: 0 | Status: DISCONTINUED | OUTPATIENT
Start: 2018-01-19 | End: 2018-01-21

## 2018-01-19 RX ORDER — CALCIUM GLUCONATE 100 MG/ML
1 VIAL (ML) INTRAVENOUS ONCE
Qty: 0 | Refills: 0 | Status: COMPLETED | OUTPATIENT
Start: 2018-01-19 | End: 2018-01-19

## 2018-01-19 RX ORDER — ALBUMIN HUMAN 25 %
250 VIAL (ML) INTRAVENOUS
Qty: 0 | Refills: 0 | Status: COMPLETED | OUTPATIENT
Start: 2018-01-19 | End: 2018-01-19

## 2018-01-19 RX ORDER — FUROSEMIDE 40 MG
20 TABLET ORAL ONCE
Qty: 0 | Refills: 0 | Status: COMPLETED | OUTPATIENT
Start: 2018-01-19 | End: 2018-01-19

## 2018-01-19 RX ORDER — INSULIN LISPRO 100/ML
VIAL (ML) SUBCUTANEOUS AT BEDTIME
Qty: 0 | Refills: 0 | Status: DISCONTINUED | OUTPATIENT
Start: 2018-01-19 | End: 2018-01-21

## 2018-01-19 RX ADMIN — Medication 500 MILLILITER(S): at 01:18

## 2018-01-19 RX ADMIN — Medication 1000 MILLIGRAM(S): at 04:30

## 2018-01-19 RX ADMIN — Medication 10 MILLIGRAM(S): at 05:26

## 2018-01-19 RX ADMIN — Medication 50 MILLIEQUIVALENT(S): at 21:00

## 2018-01-19 RX ADMIN — Medication 325 MILLIGRAM(S): at 11:21

## 2018-01-19 RX ADMIN — Medication 400 MILLIGRAM(S): at 04:00

## 2018-01-19 RX ADMIN — Medication 30 MILLIGRAM(S): at 21:17

## 2018-01-19 RX ADMIN — HYDROMORPHONE HYDROCHLORIDE 0.5 MILLIGRAM(S): 2 INJECTION INTRAMUSCULAR; INTRAVENOUS; SUBCUTANEOUS at 18:10

## 2018-01-19 RX ADMIN — Medication 30 MILLIGRAM(S): at 08:52

## 2018-01-19 RX ADMIN — Medication 30 MILLIGRAM(S): at 13:47

## 2018-01-19 RX ADMIN — Medication 0.6 MILLIGRAM(S): at 16:41

## 2018-01-19 RX ADMIN — Medication 30 MILLIGRAM(S): at 14:05

## 2018-01-19 RX ADMIN — Medication 15 MILLIGRAM(S): at 06:43

## 2018-01-19 RX ADMIN — Medication 100 MILLIEQUIVALENT(S): at 00:00

## 2018-01-19 RX ADMIN — PANTOPRAZOLE SODIUM 40 MILLIGRAM(S): 20 TABLET, DELAYED RELEASE ORAL at 08:34

## 2018-01-19 RX ADMIN — Medication 10 MILLIGRAM(S): at 13:47

## 2018-01-19 RX ADMIN — Medication 0.6 MILLIGRAM(S): at 08:37

## 2018-01-19 RX ADMIN — HYDROMORPHONE HYDROCHLORIDE 1 MILLIGRAM(S): 2 INJECTION INTRAMUSCULAR; INTRAVENOUS; SUBCUTANEOUS at 07:30

## 2018-01-19 RX ADMIN — Medication 30 MILLIGRAM(S): at 21:02

## 2018-01-19 RX ADMIN — Medication 30 MILLIGRAM(S): at 08:37

## 2018-01-19 RX ADMIN — Medication 100 MILLIGRAM(S): at 00:55

## 2018-01-19 RX ADMIN — Medication 50 MILLIEQUIVALENT(S): at 22:00

## 2018-01-19 RX ADMIN — Medication 125 MILLILITER(S): at 17:15

## 2018-01-19 RX ADMIN — Medication 100 MILLIGRAM(S): at 08:37

## 2018-01-19 RX ADMIN — Medication 200 GRAM(S): at 11:21

## 2018-01-19 RX ADMIN — Medication 6.82 MICROGRAM(S)/KG/MIN: at 09:42

## 2018-01-19 RX ADMIN — HYDROMORPHONE HYDROCHLORIDE 0.5 MILLIGRAM(S): 2 INJECTION INTRAMUSCULAR; INTRAVENOUS; SUBCUTANEOUS at 22:00

## 2018-01-19 RX ADMIN — Medication 100 MILLIGRAM(S): at 05:59

## 2018-01-19 RX ADMIN — VASOPRESSIN 2.4 UNIT(S)/MIN: 20 INJECTION INTRAVENOUS at 09:42

## 2018-01-19 RX ADMIN — Medication 1: at 08:34

## 2018-01-19 RX ADMIN — Medication 15 MILLIGRAM(S): at 07:15

## 2018-01-19 RX ADMIN — Medication 20 MILLIGRAM(S): at 19:32

## 2018-01-19 RX ADMIN — HYDROMORPHONE HYDROCHLORIDE 0.5 MILLIGRAM(S): 2 INJECTION INTRAMUSCULAR; INTRAVENOUS; SUBCUTANEOUS at 22:31

## 2018-01-19 RX ADMIN — ENOXAPARIN SODIUM 40 MILLIGRAM(S): 100 INJECTION SUBCUTANEOUS at 16:40

## 2018-01-19 RX ADMIN — HYDROMORPHONE HYDROCHLORIDE 1 MILLIGRAM(S): 2 INJECTION INTRAMUSCULAR; INTRAVENOUS; SUBCUTANEOUS at 07:00

## 2018-01-19 RX ADMIN — Medication 100 MILLIGRAM(S): at 13:47

## 2018-01-19 RX ADMIN — Medication 50 MILLIEQUIVALENT(S): at 23:00

## 2018-01-19 RX ADMIN — HYDROMORPHONE HYDROCHLORIDE 0.5 MILLIGRAM(S): 2 INJECTION INTRAMUSCULAR; INTRAVENOUS; SUBCUTANEOUS at 17:55

## 2018-01-19 RX ADMIN — Medication 100 MILLIGRAM(S): at 16:40

## 2018-01-19 RX ADMIN — Medication 10 MILLIGRAM(S): at 21:02

## 2018-01-19 NOTE — PROCEDURE NOTE - NSPROCDETAILS_GEN_ALL_CORE
sutured in place/hemostasis with direct pressure, dressing applied/positive blood return obtained via catheter/connected to a pressurized flush line/location identified, draped/prepped, sterile technique used, needle inserted/introduced/Seldinger technique/all materials/supplies accounted for at end of procedure

## 2018-01-19 NOTE — PROGRESS NOTE ADULT - SUBJECTIVE AND OBJECTIVE BOX
RYAN RASHEED   MRN#: [id number]     The patient is a 57y Male who was seen, evaluated, & examined with the CTICU staff on rounds with a multidisciplinary care plan formulated and implemented.  All available clinical, laboratory, radiographic, pharmacologic, and electrocardiographic data were reviewed & analyzed.      The patient was still in the CTICU in critical condition secondary to persistent cardiopulmonary dysfunction, hemodynamically significant hypovolemia/anemia-shock, acute postperative blood loss anemia, and uncontrolled Type II Diabetes mellitus-stress hyperglycemia.      Respiratory status required supplemental oxygen, the following of ABG’s with A-line monitoring, and continuous pulse oximetry monitoring for support & to evaluate for & prevent further decompensation seconday to persistent cardiopulmonary dysfunction.    Invasive hemodynamic monitoring with an A-line was required for the following of continuous MAP/BP monitoring to ensure adequate cardiovascular support and to evaluate for & help prevent decompensation while receiving intermittent volume expansion, an IV Levophed infusion, and an IV Vasopressin drip secondary to hemodynamically significant hypovolemia-shock.      Patient required more than the usual postoperative critical care management and I provided 80 minutes of non-continuous care to the patient.  Discussed at length with the CTICU staff and helped coordinate care.

## 2018-01-19 NOTE — PROGRESS NOTE ADULT - SUBJECTIVE AND OBJECTIVE BOX
Operation: AVR, PFO closure POD #1    Narrative: 57 y.o. male, extubated, remains on levo/vaso, stable chest tube output, mild incisional pain    Vital Signs Last 24 Hrs  T(C): 37.4 (18 Jan 2018 23:00), Max: 38.1 (18 Jan 2018 18:00)  T(F): 99.3 (18 Jan 2018 23:00), Max: 100.6 (18 Jan 2018 18:00)  HR: 86 (19 Jan 2018 00:15) (75 - 99)  BP: 93/68 (18 Jan 2018 21:15) (93/59 - 131/84)  BP(mean): 76 (18 Jan 2018 21:15) (68 - 76)  RR: 16 (19 Jan 2018 00:15) (10 - 21)  SpO2: 99% (19 Jan 2018 00:15) (94% - 100%)  CVP: 12      01-18 @ 07:01  -  01-19 @ 01:07  --------------------------------------------------------  IN:    Albumin 5%  - 250 mL: 250 mL    dexmedetomidine Infusion: 36.6 mL    insulin Infusion: 20.5 mL    IV PiggyBack: 750 mL    Lactated Ringers IV Bolus: 750 mL    norepinephrine Infusion: 113.4 mL    Oral Fluid: 60 mL    propofol Infusion: 11 mL    sodium chloride 0.45%.: 110 mL    vasopressin Infusion: 23 mL  Total IN: 2124.5 mL    OUT:    Chest Tube: 55 mL    Drain: 130 mL    Indwelling Catheter - Urethral: 2015 mL  Total OUT: 2200 mL    Total NET: -75.5 mL      LABS:                        12.7   14.0  )-----------( 144      ( 18 Jan 2018 14:03 )             36.3     01-18    141  |  105  |  16  ----------------------------<  129<H>  4.2   |  23  |  1.01    Ca    7.6<L>      18 Jan 2018 14:03      PT/INR - ( 18 Jan 2018 14:27 )   PT: 12.9 sec;   INR: 1.18 ratio         PTT - ( 18 Jan 2018 14:27 )  PTT:26.8 sec  ABG - ( 18 Jan 2018 21:34 )  pH: 7.36  /  pCO2: 47    /  pO2: 123   / HCO3: 26    / Base Excess: .7    /  SaO2: 99          MEDICATIONS  (STANDING):  albumin human  5% IVPB 250 milliLiter(s) IV Intermittent once  cefuroxime  IVPB 1500 milliGRAM(s) IV Intermittent every 8 hours  docusate sodium 100 milliGRAM(s) Oral three times a day  insulin Infusion 3 Unit(s)/Hr (3 mL/Hr) IV Continuous <Continuous>  metoclopramide Injectable 10 milliGRAM(s) IV Push every 8 hours  norepinephrine Infusion 0.05 MICROgram(s)/kG/Min (6.816 mL/Hr) IV Continuous <Continuous>  pantoprazole  Injectable 40 milliGRAM(s) IV Push daily  vasopressin Infusion 0.04 Unit(s)/Min (2.4 mL/Hr) IV Continuous <Continuous>      PHYSICAL EXAM:  General: A+Ox3, in NAD, follows commands, no focal deficits noted  Cardiovascular: S1, S2, RRR. Epicardial wires attached to EPM.   Lungs: Clear to auscultation, diminished at bilateral bases  Abdomen: Soft, Non-distended, non-tender, hypoactive bowel sounds  Extremities: Warm, well perfused, palpable distal pulses, no edema    Incision: Sternal dressing clean, dry, intact.   Lines: Right IJ intro, left radial Randolph, PIV  Drains: Padron catheter. Mediastinal chest tubes with sero-sang drainage, to low wall suction, no air leak. Mediastinal pelon to self suction.    RADIOLOGY & ADDITIONAL TESTS: AM CXR pending    ADDITIONAL DATA:   Does the patient have a history of CHF? No  -Pre-operative EF: 60    Does the patient currently have heart failure: No    Acute MI during admission or prior to transfer (within 8 weeks)? No    Extubation within 24 hours? Yes    Does the patient have a history of kidney disease? No  -Patient's baseline Creatinine: 1.25    Did the patient receive transfusion of blood and/or products? No    Yoana-operative antibiotics discontinued within 48 hours of CTU admission? Yes  -Name/date/time of discontinue: Zinacef (to complete 1/20 at midnight)

## 2018-01-19 NOTE — PHYSICAL THERAPY INITIAL EVALUATION ADULT - PERTINENT HX OF CURRENT PROBLEM, REHAB EVAL
Pt is a 57 y.o. male with PMH heart murmur for several years, last Echocardiogram revealed calcified aortic valve with decreased opening, pt. presents to PST for scheduled Aortic Valve Replacement on 1/11/18. (-) CXR 1/3/18. CXR 1/19/18: Platelike atelectasis left lower lobe. Continued below.

## 2018-01-19 NOTE — PROGRESS NOTE ADULT - ASSESSMENT
Patient care discussed on morning interdisciplinary rounds with CTS/ICU team.   Contact: [phone]    56 yo male, PMH heart murmur for several years, last Echocardiogram revealed calcified aortic valve with decreased opening.  He is now s/p AVR, PFO closure POD 1, remains on vasopressor support, extubated.

## 2018-01-19 NOTE — PROGRESS NOTE ADULT - PROBLEM SELECTOR PLAN 1
s/p AVR, PFO closure  --Start ASA once platelets stable and OK per MD Olson for valve prophylaxis  --Wean vasopressors for MAP goal 65-85  --Initiate beta-blocker for Afib prophylaxis once stable off vasopressors  --Monitor chest tube output, management per MD Olson  --Prophylaxis: DVT- (Lovenox, start once platelets stable and OK per MD Olson), venodynes; GI-Protonix  --Pain management  --Glucose control with insulin drip (A1C 5.3), transition to sliding scale once tolerating diet  --Resume appropriate home medications  --PT; OOBTC, progressive ambulation

## 2018-01-20 LAB
ALBUMIN SERPL ELPH-MCNC: 3.6 G/DL — SIGNIFICANT CHANGE UP (ref 3.3–5)
ALP SERPL-CCNC: 40 U/L — SIGNIFICANT CHANGE UP (ref 40–120)
ALT FLD-CCNC: 16 U/L RC — SIGNIFICANT CHANGE UP (ref 10–45)
ANION GAP SERPL CALC-SCNC: 8 MMOL/L — SIGNIFICANT CHANGE UP (ref 5–17)
AST SERPL-CCNC: 29 U/L — SIGNIFICANT CHANGE UP (ref 10–40)
BILIRUB SERPL-MCNC: 0.6 MG/DL — SIGNIFICANT CHANGE UP (ref 0.2–1.2)
BUN SERPL-MCNC: 22 MG/DL — SIGNIFICANT CHANGE UP (ref 7–23)
CALCIUM SERPL-MCNC: 8.2 MG/DL — LOW (ref 8.4–10.5)
CHLORIDE SERPL-SCNC: 106 MMOL/L — SIGNIFICANT CHANGE UP (ref 96–108)
CO2 SERPL-SCNC: 27 MMOL/L — SIGNIFICANT CHANGE UP (ref 22–31)
CREAT SERPL-MCNC: 1.2 MG/DL — SIGNIFICANT CHANGE UP (ref 0.5–1.3)
GAS PNL BLDA: SIGNIFICANT CHANGE UP
GAS PNL BLDA: SIGNIFICANT CHANGE UP
GLUCOSE SERPL-MCNC: 125 MG/DL — HIGH (ref 70–99)
HCT VFR BLD CALC: 27.7 % — LOW (ref 39–50)
HGB BLD-MCNC: 9.4 G/DL — LOW (ref 13–17)
MCHC RBC-ENTMCNC: 32.5 PG — SIGNIFICANT CHANGE UP (ref 27–34)
MCHC RBC-ENTMCNC: 33.9 GM/DL — SIGNIFICANT CHANGE UP (ref 32–36)
MCV RBC AUTO: 96 FL — SIGNIFICANT CHANGE UP (ref 80–100)
PLATELET # BLD AUTO: 93 K/UL — LOW (ref 150–400)
POTASSIUM SERPL-MCNC: 4.7 MMOL/L — SIGNIFICANT CHANGE UP (ref 3.5–5.3)
POTASSIUM SERPL-SCNC: 4.7 MMOL/L — SIGNIFICANT CHANGE UP (ref 3.5–5.3)
PROT SERPL-MCNC: 5.7 G/DL — LOW (ref 6–8.3)
RBC # BLD: 2.88 M/UL — LOW (ref 4.2–5.8)
RBC # FLD: 11.2 % — SIGNIFICANT CHANGE UP (ref 10.3–14.5)
SODIUM SERPL-SCNC: 141 MMOL/L — SIGNIFICANT CHANGE UP (ref 135–145)
WBC # BLD: 9.2 K/UL — SIGNIFICANT CHANGE UP (ref 3.8–10.5)
WBC # FLD AUTO: 9.2 K/UL — SIGNIFICANT CHANGE UP (ref 3.8–10.5)

## 2018-01-20 PROCEDURE — 99291 CRITICAL CARE FIRST HOUR: CPT

## 2018-01-20 PROCEDURE — 93010 ELECTROCARDIOGRAM REPORT: CPT

## 2018-01-20 PROCEDURE — 71045 X-RAY EXAM CHEST 1 VIEW: CPT | Mod: 26

## 2018-01-20 RX ORDER — ACETAMINOPHEN 500 MG
650 TABLET ORAL ONCE
Qty: 0 | Refills: 0 | Status: COMPLETED | OUTPATIENT
Start: 2018-01-20 | End: 2018-01-20

## 2018-01-20 RX ORDER — OXYCODONE AND ACETAMINOPHEN 5; 325 MG/1; MG/1
1 TABLET ORAL ONCE
Qty: 0 | Refills: 0 | Status: DISCONTINUED | OUTPATIENT
Start: 2018-01-20 | End: 2018-01-20

## 2018-01-20 RX ORDER — METOPROLOL TARTRATE 50 MG
25 TABLET ORAL EVERY 8 HOURS
Qty: 0 | Refills: 0 | Status: DISCONTINUED | OUTPATIENT
Start: 2018-01-20 | End: 2018-01-21

## 2018-01-20 RX ORDER — POTASSIUM CHLORIDE 20 MEQ
10 PACKET (EA) ORAL ONCE
Qty: 0 | Refills: 0 | Status: COMPLETED | OUTPATIENT
Start: 2018-01-20 | End: 2018-01-19

## 2018-01-20 RX ORDER — POTASSIUM CHLORIDE 20 MEQ
10 PACKET (EA) ORAL ONCE
Qty: 0 | Refills: 0 | Status: COMPLETED | OUTPATIENT
Start: 2018-01-20 | End: 2018-01-20

## 2018-01-20 RX ORDER — HYDROMORPHONE HYDROCHLORIDE 2 MG/ML
0.5 INJECTION INTRAMUSCULAR; INTRAVENOUS; SUBCUTANEOUS ONCE
Qty: 0 | Refills: 0 | Status: DISCONTINUED | OUTPATIENT
Start: 2018-01-20 | End: 2018-01-19

## 2018-01-20 RX ADMIN — Medication 25 MILLIGRAM(S): at 15:07

## 2018-01-20 RX ADMIN — OXYCODONE AND ACETAMINOPHEN 1 TABLET(S): 5; 325 TABLET ORAL at 12:30

## 2018-01-20 RX ADMIN — PANTOPRAZOLE SODIUM 40 MILLIGRAM(S): 20 TABLET, DELAYED RELEASE ORAL at 05:47

## 2018-01-20 RX ADMIN — Medication 100 MILLIGRAM(S): at 00:14

## 2018-01-20 RX ADMIN — Medication 30 MILLIGRAM(S): at 06:03

## 2018-01-20 RX ADMIN — Medication 0.6 MILLIGRAM(S): at 05:48

## 2018-01-20 RX ADMIN — Medication 30 MILLIGRAM(S): at 05:48

## 2018-01-20 RX ADMIN — Medication 650 MILLIGRAM(S): at 00:30

## 2018-01-20 RX ADMIN — Medication 30 MILLIGRAM(S): at 15:00

## 2018-01-20 RX ADMIN — Medication 30 MILLIGRAM(S): at 15:15

## 2018-01-20 RX ADMIN — Medication 50 MILLIEQUIVALENT(S): at 00:00

## 2018-01-20 RX ADMIN — Medication 30 MILLIGRAM(S): at 21:30

## 2018-01-20 RX ADMIN — Medication 30 MILLIGRAM(S): at 21:45

## 2018-01-20 RX ADMIN — Medication 650 MILLIGRAM(S): at 00:00

## 2018-01-20 RX ADMIN — Medication 100 MILLIGRAM(S): at 21:28

## 2018-01-20 RX ADMIN — ENOXAPARIN SODIUM 40 MILLIGRAM(S): 100 INJECTION SUBCUTANEOUS at 12:14

## 2018-01-20 RX ADMIN — Medication 325 MILLIGRAM(S): at 12:09

## 2018-01-20 RX ADMIN — Medication 25 MILLIGRAM(S): at 06:28

## 2018-01-20 RX ADMIN — Medication 10 MILLIGRAM(S): at 05:48

## 2018-01-20 RX ADMIN — Medication 0.6 MILLIGRAM(S): at 19:02

## 2018-01-20 RX ADMIN — OXYCODONE AND ACETAMINOPHEN 1 TABLET(S): 5; 325 TABLET ORAL at 12:00

## 2018-01-20 RX ADMIN — Medication 25 MILLIGRAM(S): at 21:28

## 2018-01-20 NOTE — PROGRESS NOTE ADULT - ASSESSMENT
57 year old male s/p AVR, PFO closure, extubated day 1, off pressors, stable  1. beta blockers for afib ppx  2. d/c femoral veronique  3. pain management  4. vte ppx  5. gi ppx  6.oob to chair, ambulate  7. blood sugar control

## 2018-01-20 NOTE — PROGRESS NOTE ADULT - SUBJECTIVE AND OBJECTIVE BOX
Operation: AVR, PFO closure  POD: 2  Narrative  patient resting comfortably OOB to chair    Vital Signs Last 24 Hrs  T(C): 37.5 (20 Jan 2018 07:00), Max: 37.7 (19 Jan 2018 19:00)  T(F): 99.5 (20 Jan 2018 07:00), Max: 99.9 (19 Jan 2018 19:00)  HR: 85 (20 Jan 2018 07:00) (76 - 102)  BP: 96/67 (19 Jan 2018 19:00) (83/53 - 96/67)  BP(mean): 78 (19 Jan 2018 19:00) (63 - 78)  RR: 15 (20 Jan 2018 07:00) (9 - 30)  SpO2: 96% (20 Jan 2018 07:00) (84% - 100%)  I&O's Detail    19 Jan 2018 07:01  -  20 Jan 2018 07:00  --------------------------------------------------------  IN:    IV PiggyBack: 450 mL    norepinephrine Infusion: 5 mL    Oral Fluid: 660 mL    vasopressin Infusion: 46 mL  Total IN: 1161 mL    OUT:    Chest Tube: 175 mL    Drain: 45 mL    Indwelling Catheter - Urethral: 1185 mL  Total OUT: 1405 mL    Total NET: -244 mL    LABS:                        9.4    9.2   )-----------( 93       ( 20 Jan 2018 01:24 )             27.7     01-20    141  |  106  |  22  ----------------------------<  125<H>  4.7   |  27  |  1.20    Ca    8.2<L>      20 Jan 2018 01:24    TPro  5.7<L>  /  Alb  3.6  /  TBili  0.6  /  DBili  x   /  AST  29  /  ALT  16  /  AlkPhos  40  01-20    PT/INR - ( 18 Jan 2018 14:27 )   PT: 12.9 sec;   INR: 1.18 ratio         PTT - ( 18 Jan 2018 14:27 )  PTT:26.8 sec  ABG - ( 20 Jan 2018 01:05 )  pH: 7.38  /  pCO2: 45    /  pO2: 86    / HCO3: 26    / Base Excess: 1.5   /  SaO2: 97          MEDICATIONS  (STANDING):  aspirin 325 milliGRAM(s) Oral daily  colchicine 0.6 milliGRAM(s) Oral two times a day  dextrose 50% Injectable 50 milliLiter(s) IV Push every 15 minutes  dextrose 50% Injectable 25 milliLiter(s) IV Push every 15 minutes  docusate sodium 100 milliGRAM(s) Oral three times a day  enoxaparin Injectable 40 milliGRAM(s) SubCutaneous daily  insulin lispro (HumaLOG) corrective regimen sliding scale   SubCutaneous three times a day before meals  insulin lispro (HumaLOG) corrective regimen sliding scale   SubCutaneous at bedtime  ketorolac   Injectable 30 milliGRAM(s) IV Push every 8 hours  metoprolol     tartrate 25 milliGRAM(s) Oral every 8 hours  pantoprazole    Tablet 40 milliGRAM(s) Oral before breakfast    MEDICATIONS  (PRN):    General: Alert and orientedx3, in no acute distress  Chest: sternal dressing C/D/I  Heart: S1, S2, regular rate and rhythm  Lungs: clear to auscultation B/L, without wheezes, rhonci, rales  Abdomen: Soft, non distended, non tender, positive bowel sounds  Extremeties: without edema B/L LE, positive DP pulses B/L     Does the patient have a history of CHF: no  -If yes, systolic or diastolic:  -pre-operative EF: 60%    Extubation within 24 hours: yes    Does the patient have a history of kidney disease: no  -give CKD stage if applicable:  -what is patient's baseline Creatinine: 1.25    Beta Blockers contraindicated for the first 24 hours due to vasopressor/inotrpic medication: yes  -If on pressors, indication: was on pressors for hemodynamic support, currently off    Did the patient receive transfusion of blood and/or products: no  -If yes, indication:    DVT PPX: scd b/l, lovenox sub q    Yoana-operative antibiotics discontinued within 48 hours of CTU admission: yes  -name/date/time of discontinue  -jelani/1-20-18/19:00    Patient care discussed on morning interdisciplinary rounds with CTS team.

## 2018-01-21 LAB
ALBUMIN SERPL ELPH-MCNC: 3.8 G/DL — SIGNIFICANT CHANGE UP (ref 3.3–5)
ALP SERPL-CCNC: 59 U/L — SIGNIFICANT CHANGE UP (ref 40–120)
ALT FLD-CCNC: 43 U/L RC — SIGNIFICANT CHANGE UP (ref 10–45)
ANION GAP SERPL CALC-SCNC: 11 MMOL/L — SIGNIFICANT CHANGE UP (ref 5–17)
APTT BLD: 27.3 SEC — LOW (ref 27.5–37.4)
AST SERPL-CCNC: 46 U/L — HIGH (ref 10–40)
BILIRUB SERPL-MCNC: 1 MG/DL — SIGNIFICANT CHANGE UP (ref 0.2–1.2)
BUN SERPL-MCNC: 17 MG/DL — SIGNIFICANT CHANGE UP (ref 7–23)
CALCIUM SERPL-MCNC: 8.5 MG/DL — SIGNIFICANT CHANGE UP (ref 8.4–10.5)
CHLORIDE SERPL-SCNC: 105 MMOL/L — SIGNIFICANT CHANGE UP (ref 96–108)
CO2 SERPL-SCNC: 25 MMOL/L — SIGNIFICANT CHANGE UP (ref 22–31)
CREAT SERPL-MCNC: 0.98 MG/DL — SIGNIFICANT CHANGE UP (ref 0.5–1.3)
GLUCOSE SERPL-MCNC: 113 MG/DL — HIGH (ref 70–99)
HCT VFR BLD CALC: 31 % — LOW (ref 39–50)
HGB BLD-MCNC: 10.6 G/DL — LOW (ref 13–17)
INR BLD: 1.04 RATIO — SIGNIFICANT CHANGE UP (ref 0.88–1.16)
MAGNESIUM SERPL-MCNC: 1.9 MG/DL — SIGNIFICANT CHANGE UP (ref 1.6–2.6)
MCHC RBC-ENTMCNC: 32.7 PG — SIGNIFICANT CHANGE UP (ref 27–34)
MCHC RBC-ENTMCNC: 34.3 GM/DL — SIGNIFICANT CHANGE UP (ref 32–36)
MCV RBC AUTO: 95.3 FL — SIGNIFICANT CHANGE UP (ref 80–100)
PHOSPHATE SERPL-MCNC: 1.5 MG/DL — LOW (ref 2.5–4.5)
PLATELET # BLD AUTO: 115 K/UL — LOW (ref 150–400)
POTASSIUM SERPL-MCNC: 4.2 MMOL/L — SIGNIFICANT CHANGE UP (ref 3.5–5.3)
POTASSIUM SERPL-SCNC: 4.2 MMOL/L — SIGNIFICANT CHANGE UP (ref 3.5–5.3)
PROT SERPL-MCNC: 6.5 G/DL — SIGNIFICANT CHANGE UP (ref 6–8.3)
PROTHROM AB SERPL-ACNC: 11.4 SEC — SIGNIFICANT CHANGE UP (ref 9.8–12.7)
RBC # BLD: 3.25 M/UL — LOW (ref 4.2–5.8)
RBC # FLD: 10.9 % — SIGNIFICANT CHANGE UP (ref 10.3–14.5)
SODIUM SERPL-SCNC: 141 MMOL/L — SIGNIFICANT CHANGE UP (ref 135–145)
WBC # BLD: 10.3 K/UL — SIGNIFICANT CHANGE UP (ref 3.8–10.5)
WBC # FLD AUTO: 10.3 K/UL — SIGNIFICANT CHANGE UP (ref 3.8–10.5)

## 2018-01-21 PROCEDURE — 71045 X-RAY EXAM CHEST 1 VIEW: CPT | Mod: 26

## 2018-01-21 PROCEDURE — 99291 CRITICAL CARE FIRST HOUR: CPT

## 2018-01-21 RX ORDER — OXYCODONE AND ACETAMINOPHEN 5; 325 MG/1; MG/1
1 TABLET ORAL EVERY 6 HOURS
Qty: 0 | Refills: 0 | Status: DISCONTINUED | OUTPATIENT
Start: 2018-01-21 | End: 2018-01-22

## 2018-01-21 RX ORDER — METOPROLOL TARTRATE 50 MG
25 TABLET ORAL ONCE
Qty: 0 | Refills: 0 | Status: DISCONTINUED | OUTPATIENT
Start: 2018-01-21 | End: 2018-01-21

## 2018-01-21 RX ORDER — METOPROLOL TARTRATE 50 MG
50 TABLET ORAL EVERY 8 HOURS
Qty: 0 | Refills: 0 | Status: DISCONTINUED | OUTPATIENT
Start: 2018-01-21 | End: 2018-01-22

## 2018-01-21 RX ORDER — ALPRAZOLAM 0.25 MG
0.25 TABLET ORAL
Qty: 0 | Refills: 0 | Status: DISCONTINUED | OUTPATIENT
Start: 2018-01-21 | End: 2018-01-22

## 2018-01-21 RX ORDER — METOPROLOL TARTRATE 50 MG
50 TABLET ORAL EVERY 24 HOURS
Qty: 0 | Refills: 0 | Status: DISCONTINUED | OUTPATIENT
Start: 2018-01-21 | End: 2018-01-21

## 2018-01-21 RX ORDER — SODIUM CHLORIDE 9 MG/ML
3 INJECTION INTRAMUSCULAR; INTRAVENOUS; SUBCUTANEOUS EVERY 8 HOURS
Qty: 0 | Refills: 0 | Status: DISCONTINUED | OUTPATIENT
Start: 2018-01-21 | End: 2018-01-22

## 2018-01-21 RX ORDER — OXYCODONE AND ACETAMINOPHEN 5; 325 MG/1; MG/1
2 TABLET ORAL EVERY 6 HOURS
Qty: 0 | Refills: 0 | Status: DISCONTINUED | OUTPATIENT
Start: 2018-01-21 | End: 2018-01-22

## 2018-01-21 RX ORDER — METOPROLOL TARTRATE 50 MG
50 TABLET ORAL EVERY 8 HOURS
Qty: 0 | Refills: 0 | Status: DISCONTINUED | OUTPATIENT
Start: 2018-01-21 | End: 2018-01-21

## 2018-01-21 RX ADMIN — PANTOPRAZOLE SODIUM 40 MILLIGRAM(S): 20 TABLET, DELAYED RELEASE ORAL at 05:40

## 2018-01-21 RX ADMIN — Medication 0.6 MILLIGRAM(S): at 05:42

## 2018-01-21 RX ADMIN — Medication 50 MILLIGRAM(S): at 22:16

## 2018-01-21 RX ADMIN — OXYCODONE AND ACETAMINOPHEN 2 TABLET(S): 5; 325 TABLET ORAL at 07:55

## 2018-01-21 RX ADMIN — Medication 100 MILLIGRAM(S): at 22:16

## 2018-01-21 RX ADMIN — OXYCODONE AND ACETAMINOPHEN 1 TABLET(S): 5; 325 TABLET ORAL at 10:35

## 2018-01-21 RX ADMIN — Medication 50 MILLIGRAM(S): at 14:24

## 2018-01-21 RX ADMIN — Medication 0.25 MILLIGRAM(S): at 22:17

## 2018-01-21 RX ADMIN — Medication 100 MILLIGRAM(S): at 14:24

## 2018-01-21 RX ADMIN — SODIUM CHLORIDE 3 MILLILITER(S): 9 INJECTION INTRAMUSCULAR; INTRAVENOUS; SUBCUTANEOUS at 16:52

## 2018-01-21 RX ADMIN — Medication 25 MILLIGRAM(S): at 05:40

## 2018-01-21 RX ADMIN — SODIUM CHLORIDE 3 MILLILITER(S): 9 INJECTION INTRAMUSCULAR; INTRAVENOUS; SUBCUTANEOUS at 22:13

## 2018-01-21 RX ADMIN — Medication 100 MILLIGRAM(S): at 05:40

## 2018-01-21 RX ADMIN — Medication 0.25 MILLIGRAM(S): at 03:12

## 2018-01-21 RX ADMIN — Medication 325 MILLIGRAM(S): at 11:45

## 2018-01-21 RX ADMIN — Medication 0.6 MILLIGRAM(S): at 16:49

## 2018-01-21 RX ADMIN — ENOXAPARIN SODIUM 40 MILLIGRAM(S): 100 INJECTION SUBCUTANEOUS at 11:45

## 2018-01-21 RX ADMIN — OXYCODONE AND ACETAMINOPHEN 2 TABLET(S): 5; 325 TABLET ORAL at 08:30

## 2018-01-21 RX ADMIN — Medication 62.5 MILLIMOLE(S): at 02:43

## 2018-01-21 RX ADMIN — OXYCODONE AND ACETAMINOPHEN 1 TABLET(S): 5; 325 TABLET ORAL at 11:05

## 2018-01-21 RX ADMIN — Medication 0.25 MILLIGRAM(S): at 04:00

## 2018-01-21 NOTE — PROGRESS NOTE ADULT - ASSESSMENT
56 yo male, PMH heart murmur for several years, last Echocardiogram revealed calcified aortic valve with decreased opening, POD 3 AVR / Pfo closure. Post op course complicated by hypovolemia/anemia-shock requiring an IV Levophed infusion, and an IV Vasopressin drip,    , acute postperative blood loss anemia, and uncontrolled Type II Diabetes mellitus-stress hyperglycemia.  Respiratory status required supplemental oxygen which was eventually weaned off.  1/21-Transfered to [address] [place] [address] +

## 2018-01-21 NOTE — PROGRESS NOTE ADULT - SUBJECTIVE AND OBJECTIVE BOX
CRITICAL CARE ATTENDING - CTICU    MEDICATIONS  (STANDING):  aspirin 325 milliGRAM(s) Oral daily  colchicine 0.6 milliGRAM(s) Oral two times a day  dextrose 50% Injectable 50 milliLiter(s) IV Push every 15 minutes  dextrose 50% Injectable 25 milliLiter(s) IV Push every 15 minutes  docusate sodium 100 milliGRAM(s) Oral three times a day  enoxaparin Injectable 40 milliGRAM(s) SubCutaneous daily  insulin lispro (HumaLOG) corrective regimen sliding scale   SubCutaneous three times a day before meals  insulin lispro (HumaLOG) corrective regimen sliding scale   SubCutaneous at bedtime  metoprolol     tartrate 50 milliGRAM(s) Oral every 8 hours  pantoprazole    Tablet 40 milliGRAM(s) Oral before breakfast                                    10.6   10.3  )-----------( 115      ( 2018 00:53 )             31.0           141  |  105  |  17  ----------------------------<  113<H>  4.2   |  25  |  0.98    Ca    8.5      2018 00:53  Phos  1.5       Mg     1.9         TPro  6.5  /  Alb  3.8  /  TBili  1.0  /  DBili  x   /  AST  46<H>  /  ALT  43  /  AlkPhos  59        PT/INR - ( 2018 00:53 )   PT: 11.4 sec;   INR: 1.04 ratio         PTT - ( 2018 00:53 )  PTT:27.3 sec        Daily     Daily Weight in k.1 (2018 00:00)       @ 07:01  -   @ 07:00  --------------------------------------------------------  IN: 240 mL / OUT: 1670 mL / NET: -1430 mL        Critically Ill patient  : [ ] preoperative ,   [x ] post operative    Requires :  [ ] Arterial Line   [ ] Central Line  [ ] PA catheter  [ ] IABP  [ ] ECMO  [ ] LVAD  [ ] Ventilator  [x ] pacemaker [ ] Impella.    Bedside evaluation , monitoring , treatment of hemodynamics , fluids , IVP/ IVCD meds.        Diagnosis:     POD 3 AVR / Pfo closure    Hypotension - resolved    HYpertensioin    fluid overload     Temporary pacemaker (TPM) interrogation and setting.     Hyperglycemia    PMH: AS AI /                    Discussed with CT surgeon, Physician's Assistant - Nurse Practitioner- Critical care medicine team.   Dicussed at  AM / PM rounds.   Chart, labs , films reviewed.    Total Time: 60 min.

## 2018-01-21 NOTE — PROGRESS NOTE ADULT - SUBJECTIVE AND OBJECTIVE BOX
VITAL SIGNS    Telemetry:  NSR  Vital Signs Last 24 Hrs  T(C): 37.2 (18 @ 12:11), Max: 37.3 (18 @ 16:00)  T(F): 98.9 (18 @ 12:11), Max: 99.1 (18 @ 16:00)  HR: 91 (18 @ 12:11) (85 - 96)  BP: 138/86 (18 @ 12:11) (108/78 - 152/81)  RR: 17 (18 @ 12:11) (14 - 39)  SpO2: 93% (18 @ 12:11) (91% - 97%)             @ 07:  -   @ 07:00  --------------------------------------------------------  IN: 240 mL / OUT: 1670 mL / NET: -1430 mL     @ 07:  -   @ 14:39  --------------------------------------------------------  IN: 300 mL / OUT: 400 mL / NET: -100 mL       Daily     Daily Weight in k.1 (2018 00:00)  Admit Wt: Drug Dosing Weight  Height (cm): 170.18 (2018 05:54)  Weight (kg): 72.7 (2018 05:54)  BMI (kg/m2): 25.1 (2018 05:54)  BSA (m2): 1.84 (2018 05:54)     Daily Weight in k.1 (18 @ 00:00)    LABS      141  |  105  |  17  ----------------------------<  113<H>  4.2   |  25  |  0.98    Ca    8.5      2018 00:53  Phos  1.5       Mg     1.9         TPro  6.5  /  Alb  3.8  /  TBili  1.0  /  DBili  x   /  AST  46<H>  /  ALT  43  /  AlkPhos  59                                   10.6   10.3  )-----------( 115      ( 2018 00:53 )             31.0          PT/INR - ( 2018 00:53 )   PT: 11.4 sec;   INR: 1.04 ratio         PTT - ( 2018 00:53 )  PTT:27.3 sec        Bilirubin Total, Serum: 1.0 mg/dL ( @ 00:53)    CAPILLARY BLOOD GLUCOSE  97 (2018 18:00)      POCT Blood Glucose.: 96 mg/dL (2018 08:35)  POCT Blood Glucose.: 104 mg/dL (2018 21:32)  POCT Blood Glucose.: 97 mg/dL (2018 17:48)          Drains:     MS       [  ]   [  ]            L Pleural [  ]            R Pleural  [  ]            KEEGAN  [  ]           Padron  [  ]    Pacing Wires      [  ]   Settings:                                  Isolated  [  ]                    CXR:      MEDICATIONS  ALPRAZolam 0.25 milliGRAM(s) Oral two times a day PRN  aspirin 325 milliGRAM(s) Oral daily  colchicine 0.6 milliGRAM(s) Oral two times a day  docusate sodium 100 milliGRAM(s) Oral three times a day  enoxaparin Injectable 40 milliGRAM(s) SubCutaneous daily  metoprolol     tartrate 50 milliGRAM(s) Oral every 8 hours  oxyCODONE    5 mG/acetaminophen 325 mG 1 Tablet(s) Oral every 6 hours PRN  oxyCODONE    5 mG/acetaminophen 325 mG 2 Tablet(s) Oral every 6 hours PRN  pantoprazole    Tablet 40 milliGRAM(s) Oral before breakfast  sodium chloride 0.9% lock flush 3 milliLiter(s) IV Push every 8 hours      PHYSICAL EXAM      Neurology: alert and oriented x 3, nonfocal, no gross deficits  CV :S1S2  Sternal Wound :  CDI , Stable  Lungs: cta b/l  Abdomen: soft, nontender, nondistended, positive bowel sounds, last bowel movement   :  voids     Extremities:  no edema no cyanosis +pp b/l                PAST MEDICAL & SURGICAL HISTORY:  AS (aortic stenosis): mod-severe  HTN (hypertension)  No significant past surgical history                 Discussed with Cardiothoracic Team at AM rounds.

## 2018-01-22 ENCOUNTER — TRANSCRIPTION ENCOUNTER (OUTPATIENT)
Age: 58
End: 2018-01-22

## 2018-01-22 VITALS
HEART RATE: 93 BPM | RESPIRATION RATE: 18 BRPM | DIASTOLIC BLOOD PRESSURE: 91 MMHG | SYSTOLIC BLOOD PRESSURE: 134 MMHG | OXYGEN SATURATION: 97 %

## 2018-01-22 LAB
ANION GAP SERPL CALC-SCNC: 12 MMOL/L — SIGNIFICANT CHANGE UP (ref 5–17)
BUN SERPL-MCNC: 15 MG/DL — SIGNIFICANT CHANGE UP (ref 7–23)
CALCIUM SERPL-MCNC: 9 MG/DL — SIGNIFICANT CHANGE UP (ref 8.4–10.5)
CHLORIDE SERPL-SCNC: 105 MMOL/L — SIGNIFICANT CHANGE UP (ref 96–108)
CO2 SERPL-SCNC: 24 MMOL/L — SIGNIFICANT CHANGE UP (ref 22–31)
CREAT SERPL-MCNC: 0.87 MG/DL — SIGNIFICANT CHANGE UP (ref 0.5–1.3)
GLUCOSE SERPL-MCNC: 90 MG/DL — SIGNIFICANT CHANGE UP (ref 70–99)
HCT VFR BLD CALC: 33.8 % — LOW (ref 39–50)
HGB BLD-MCNC: 12 G/DL — LOW (ref 13–17)
MCHC RBC-ENTMCNC: 32.8 PG — SIGNIFICANT CHANGE UP (ref 27–34)
MCHC RBC-ENTMCNC: 35.4 GM/DL — SIGNIFICANT CHANGE UP (ref 32–36)
MCV RBC AUTO: 92.8 FL — SIGNIFICANT CHANGE UP (ref 80–100)
PLATELET # BLD AUTO: 165 K/UL — SIGNIFICANT CHANGE UP (ref 150–400)
POTASSIUM SERPL-MCNC: 3.7 MMOL/L — SIGNIFICANT CHANGE UP (ref 3.5–5.3)
POTASSIUM SERPL-SCNC: 3.7 MMOL/L — SIGNIFICANT CHANGE UP (ref 3.5–5.3)
RBC # BLD: 3.65 M/UL — LOW (ref 4.2–5.8)
RBC # FLD: 10.8 % — SIGNIFICANT CHANGE UP (ref 10.3–14.5)
SODIUM SERPL-SCNC: 141 MMOL/L — SIGNIFICANT CHANGE UP (ref 135–145)
WBC # BLD: 8.5 K/UL — SIGNIFICANT CHANGE UP (ref 3.8–10.5)
WBC # FLD AUTO: 8.5 K/UL — SIGNIFICANT CHANGE UP (ref 3.8–10.5)

## 2018-01-22 PROCEDURE — 85014 HEMATOCRIT: CPT

## 2018-01-22 PROCEDURE — 85730 THROMBOPLASTIN TIME PARTIAL: CPT

## 2018-01-22 PROCEDURE — C1769: CPT

## 2018-01-22 PROCEDURE — 88311 DECALCIFY TISSUE: CPT

## 2018-01-22 PROCEDURE — 80076 HEPATIC FUNCTION PANEL: CPT

## 2018-01-22 PROCEDURE — 82553 CREATINE MB FRACTION: CPT

## 2018-01-22 PROCEDURE — 94002 VENT MGMT INPAT INIT DAY: CPT

## 2018-01-22 PROCEDURE — 82947 ASSAY GLUCOSE BLOOD QUANT: CPT

## 2018-01-22 PROCEDURE — P9041: CPT

## 2018-01-22 PROCEDURE — 84484 ASSAY OF TROPONIN QUANT: CPT

## 2018-01-22 PROCEDURE — 80048 BASIC METABOLIC PNL TOTAL CA: CPT

## 2018-01-22 PROCEDURE — 84100 ASSAY OF PHOSPHORUS: CPT

## 2018-01-22 PROCEDURE — 83735 ASSAY OF MAGNESIUM: CPT

## 2018-01-22 PROCEDURE — 86891 AUTOLOGOUS BLOOD OP SALVAGE: CPT

## 2018-01-22 PROCEDURE — 97116 GAIT TRAINING THERAPY: CPT

## 2018-01-22 PROCEDURE — 85610 PROTHROMBIN TIME: CPT

## 2018-01-22 PROCEDURE — 83605 ASSAY OF LACTIC ACID: CPT

## 2018-01-22 PROCEDURE — 82550 ASSAY OF CK (CPK): CPT

## 2018-01-22 PROCEDURE — 82435 ASSAY OF BLOOD CHLORIDE: CPT

## 2018-01-22 PROCEDURE — 84295 ASSAY OF SERUM SODIUM: CPT

## 2018-01-22 PROCEDURE — 84132 ASSAY OF SERUM POTASSIUM: CPT

## 2018-01-22 PROCEDURE — 93005 ELECTROCARDIOGRAM TRACING: CPT

## 2018-01-22 PROCEDURE — 71045 X-RAY EXAM CHEST 1 VIEW: CPT

## 2018-01-22 PROCEDURE — 36430 TRANSFUSION BLD/BLD COMPNT: CPT

## 2018-01-22 PROCEDURE — 82330 ASSAY OF CALCIUM: CPT

## 2018-01-22 PROCEDURE — 99238 HOSP IP/OBS DSCHRG MGMT 30/<: CPT

## 2018-01-22 PROCEDURE — 97161 PT EVAL LOW COMPLEX 20 MIN: CPT

## 2018-01-22 PROCEDURE — 97530 THERAPEUTIC ACTIVITIES: CPT

## 2018-01-22 PROCEDURE — 86923 COMPATIBILITY TEST ELECTRIC: CPT

## 2018-01-22 PROCEDURE — P9045: CPT

## 2018-01-22 PROCEDURE — P9016: CPT

## 2018-01-22 PROCEDURE — C1889: CPT

## 2018-01-22 PROCEDURE — 88305 TISSUE EXAM BY PATHOLOGIST: CPT

## 2018-01-22 PROCEDURE — 80053 COMPREHEN METABOLIC PANEL: CPT

## 2018-01-22 PROCEDURE — 71045 X-RAY EXAM CHEST 1 VIEW: CPT | Mod: 26

## 2018-01-22 PROCEDURE — 82962 GLUCOSE BLOOD TEST: CPT

## 2018-01-22 PROCEDURE — 82803 BLOOD GASES ANY COMBINATION: CPT

## 2018-01-22 PROCEDURE — 82565 ASSAY OF CREATININE: CPT

## 2018-01-22 PROCEDURE — 86900 BLOOD TYPING SEROLOGIC ABO: CPT

## 2018-01-22 PROCEDURE — 84443 ASSAY THYROID STIM HORMONE: CPT

## 2018-01-22 PROCEDURE — P9047: CPT

## 2018-01-22 PROCEDURE — 86901 BLOOD TYPING SEROLOGIC RH(D): CPT

## 2018-01-22 PROCEDURE — 85027 COMPLETE CBC AUTOMATED: CPT

## 2018-01-22 RX ORDER — OXYCODONE AND ACETAMINOPHEN 5; 325 MG/1; MG/1
1 TABLET ORAL
Qty: 30 | Refills: 0
Start: 2018-01-22 | End: 2018-01-26

## 2018-01-22 RX ORDER — DOCUSATE SODIUM 100 MG
1 CAPSULE ORAL
Qty: 0 | Refills: 0 | DISCHARGE
Start: 2018-01-22

## 2018-01-22 RX ORDER — COLCHICINE 0.6 MG
1 TABLET ORAL
Qty: 14 | Refills: 0
Start: 2018-01-22 | End: 2018-01-28

## 2018-01-22 RX ORDER — METOPROLOL TARTRATE 50 MG
75 TABLET ORAL
Qty: 0 | Refills: 0 | Status: DISCONTINUED | OUTPATIENT
Start: 2018-01-22 | End: 2018-01-22

## 2018-01-22 RX ORDER — METOPROLOL TARTRATE 50 MG
3 TABLET ORAL
Qty: 180 | Refills: 0
Start: 2018-01-22 | End: 2018-02-20

## 2018-01-22 RX ORDER — METOPROLOL TARTRATE 50 MG
25 TABLET ORAL ONCE
Qty: 0 | Refills: 0 | Status: COMPLETED | OUTPATIENT
Start: 2018-01-22 | End: 2018-01-22

## 2018-01-22 RX ORDER — ALPRAZOLAM 0.25 MG
1 TABLET ORAL
Qty: 10 | Refills: 0
Start: 2018-01-22 | End: 2018-01-26

## 2018-01-22 RX ORDER — POTASSIUM CHLORIDE 20 MEQ
20 PACKET (EA) ORAL ONCE
Qty: 0 | Refills: 0 | Status: COMPLETED | OUTPATIENT
Start: 2018-01-22 | End: 2018-01-22

## 2018-01-22 RX ORDER — AMLODIPINE BESYLATE 2.5 MG/1
1 TABLET ORAL
Qty: 0 | Refills: 0 | COMMUNITY

## 2018-01-22 RX ADMIN — Medication 50 MILLIGRAM(S): at 05:24

## 2018-01-22 RX ADMIN — PANTOPRAZOLE SODIUM 40 MILLIGRAM(S): 20 TABLET, DELAYED RELEASE ORAL at 05:24

## 2018-01-22 RX ADMIN — SODIUM CHLORIDE 3 MILLILITER(S): 9 INJECTION INTRAMUSCULAR; INTRAVENOUS; SUBCUTANEOUS at 05:22

## 2018-01-22 RX ADMIN — SODIUM CHLORIDE 3 MILLILITER(S): 9 INJECTION INTRAMUSCULAR; INTRAVENOUS; SUBCUTANEOUS at 13:08

## 2018-01-22 RX ADMIN — ENOXAPARIN SODIUM 40 MILLIGRAM(S): 100 INJECTION SUBCUTANEOUS at 11:53

## 2018-01-22 RX ADMIN — Medication 325 MILLIGRAM(S): at 11:53

## 2018-01-22 RX ADMIN — Medication 100 MILLIGRAM(S): at 11:56

## 2018-01-22 RX ADMIN — Medication 0.6 MILLIGRAM(S): at 05:23

## 2018-01-22 RX ADMIN — Medication 25 MILLIGRAM(S): at 09:24

## 2018-01-22 RX ADMIN — Medication 20 MILLIEQUIVALENT(S): at 11:55

## 2018-01-22 RX ADMIN — Medication 100 MILLIGRAM(S): at 05:24

## 2018-01-22 NOTE — DISCHARGE NOTE ADULT - CARE PROVIDER_API CALL
Clint Olson), Surgery; Surgical Critical Care; Thoracic and Cardiac Surgery  [address] [place] [address]  Phone: [phone]  Fax: [phone]    Eliezer Sanon), Cardiovascular Disease  [address]  [address]  [place] [address]  Phone: [phone]  Fax: [phone]

## 2018-01-22 NOTE — DISCHARGE NOTE ADULT - MEDICATION SUMMARY - MEDICATIONS TO STOP TAKING
I will STOP taking the medications listed below when I get home from the hospital:    amLODIPine 2.5 mg oral tablet  -- 1 tab(s) by mouth 3 times a day

## 2018-01-22 NOTE — DISCHARGE NOTE ADULT - NS AS ACTIVITY OBS
No Heavy lifting/straining/Walking-Indoors allowed/Stairs allowed Showering allowed/Walking-Indoors allowed/No Heavy lifting/straining/Stairs allowed

## 2018-01-22 NOTE — DISCHARGE NOTE ADULT - HOME CARE AGENCY
[facility] [phone] Visiting nurse to call to arrange home care visit for 1-2 days after discharge. Please call home care agency with any questions or concerns.

## 2018-01-22 NOTE — DISCHARGE NOTE ADULT - OTHER SIGNIFICANT FINDINGS
s   I feel well,  no sob  no chest pain  COR  RRR  Lungs  CTS  B/L  GI   soft ND  nt   good BS  EXT  no edema   no calf tenderness

## 2018-01-22 NOTE — DISCHARGE NOTE ADULT - CARE PLAN
Principal Discharge DX:	AS (aortic stenosis)  Goal:	recovery  Assessment and plan of treatment:	walk daily

## 2018-01-22 NOTE — DISCHARGE NOTE ADULT - HOSPITAL COURSE
· Assessment		  56 yo male, PMH heart murmur for several years, last Echocardiogram revealed calcified aortic valve with decreased opening, POD 3 AVR / Pfo closure. Post op course complicated by hypovolemia/anemia-shock requiring an IV Levophed infusion, and an IV Vasopressin drip,    , acute postperative blood loss anemia, and uncontrolled Type II Diabetes mellitus-stress hyperglycemia.  Respiratory status required supplemental oxygen which was eventually weaned off.  1/21-Transfered to [address] [place] [address]  1/22 changed lop 75 q12

## 2018-01-22 NOTE — DISCHARGE NOTE ADULT - CARE PROVIDERS DIRECT ADDRESSES
,[email or web address]

## 2018-01-22 NOTE — DISCHARGE NOTE ADULT - MEDICATION SUMMARY - MEDICATIONS TO TAKE
I will START or STAY ON the medications listed below when I get home from the hospital:    oxyCODONE-acetaminophen 5 mg-325 mg oral tablet  -- 1 tab(s) by mouth every 4 hours, As Needed -Moderate Pain (4 - 6) \  may take 2 tabs for severe pain MDD:6  -- Indication: For pain    aspirin 81 mg oral tablet  -- 1 tab(s) by mouth once a day, will continue during gali-op period  -- Indication: For heart health    colchicine 0.6 mg oral tablet  -- 1 tab(s) by mouth 2 times a day  -- Indication: For inflammation     x 7 days    ALPRAZolam 0.25 mg oral tablet  -- 1 tab(s) by mouth 2 times a day, As needed, anxiety MDD:2  -- Indication: For anxiety    metoprolol tartrate 25 mg oral tablet  -- 3 tab(s) by mouth 2 times a day   -- Indication: For HR    docusate sodium 100 mg oral capsule  -- 1 cap(s) by mouth 3 times a day  -- Indication: For GI

## 2018-01-22 NOTE — DISCHARGE NOTE ADULT - PATIENT PORTAL LINK FT
“You can access the [facility] Patient Portal, offered by [facility], by registering with the following website: [email or web address]”

## 2018-01-23 LAB — SURGICAL PATHOLOGY STUDY: SIGNIFICANT CHANGE UP

## 2018-01-26 PROBLEM — Z95.3 S/P AORTIC VALVE REPLACEMENT WITH BIOPROSTHETIC VALVE: Status: RESOLVED | Noted: 2018-01-26 | Resolved: 2018-01-26

## 2018-01-26 PROBLEM — Z98.890 S/P PATENT FORAMEN OVALE CLOSURE: Status: ACTIVE | Noted: 2018-01-26

## 2018-01-26 RX ORDER — MUPIROCIN 20 MG/G
2 OINTMENT TOPICAL
Qty: 22 | Refills: 0 | Status: DISCONTINUED | COMMUNITY
Start: 2018-01-04

## 2018-01-26 RX ORDER — ASPIRIN 81 MG
81 TABLET, DELAYED RELEASE (ENTERIC COATED) ORAL DAILY
Refills: 0 | Status: ACTIVE | COMMUNITY

## 2018-01-31 ENCOUNTER — NON-APPOINTMENT (OUTPATIENT)
Age: 58
End: 2018-01-31

## 2018-01-31 ENCOUNTER — APPOINTMENT (OUTPATIENT)
Dept: CARDIOLOGY | Facility: CLINIC | Age: 58
End: 2018-01-31
Payer: COMMERCIAL

## 2018-01-31 VITALS
RESPIRATION RATE: 17 BRPM | SYSTOLIC BLOOD PRESSURE: 115 MMHG | BODY MASS INDEX: 24.17 KG/M2 | DIASTOLIC BLOOD PRESSURE: 80 MMHG | HEIGHT: 67 IN | HEART RATE: 87 BPM | WEIGHT: 154 LBS | OXYGEN SATURATION: 97 %

## 2018-01-31 PROCEDURE — 93000 ELECTROCARDIOGRAM COMPLETE: CPT

## 2018-01-31 PROCEDURE — 99215 OFFICE O/P EST HI 40 MIN: CPT

## 2018-02-01 ENCOUNTER — APPOINTMENT (OUTPATIENT)
Dept: CARDIOTHORACIC SURGERY | Facility: CLINIC | Age: 58
End: 2018-02-01

## 2018-02-01 VITALS
BODY MASS INDEX: 24.01 KG/M2 | WEIGHT: 153 LBS | SYSTOLIC BLOOD PRESSURE: 124 MMHG | TEMPERATURE: 97.9 F | RESPIRATION RATE: 14 BRPM | OXYGEN SATURATION: 99 % | HEART RATE: 88 BPM | DIASTOLIC BLOOD PRESSURE: 79 MMHG | HEIGHT: 67 IN

## 2018-02-01 DIAGNOSIS — Z95.3 PRESENCE OF XENOGENIC HEART VALVE: ICD-10-CM

## 2018-02-01 DIAGNOSIS — Z87.74 OTHER SPECIFIED POSTPROCEDURAL STATES: ICD-10-CM

## 2018-02-01 DIAGNOSIS — Z98.890 OTHER SPECIFIED POSTPROCEDURAL STATES: ICD-10-CM

## 2018-02-01 RX ORDER — ALPRAZOLAM 0.25 MG/1
0.25 TABLET ORAL
Refills: 0 | Status: DISCONTINUED | COMMUNITY
End: 2018-02-01

## 2018-02-01 RX ORDER — COLCHICINE 0.6 MG/1
0.6 TABLET ORAL TWICE DAILY
Qty: 28 | Refills: 0 | Status: DISCONTINUED | COMMUNITY
End: 2018-02-01

## 2018-02-01 RX ORDER — OXYCODONE HYDROCHLORIDE AND ACETAMINOPHEN 5; 325 MG/1; MG/1
5-325 TABLET ORAL 4 TIMES DAILY
Qty: 18 | Refills: 0 | Status: DISCONTINUED | COMMUNITY
End: 2018-02-01

## 2018-02-01 RX ORDER — DOCUSATE SODIUM 100 MG/1
100 CAPSULE ORAL 3 TIMES DAILY
Refills: 0 | Status: DISCONTINUED | COMMUNITY
End: 2018-02-01

## 2018-02-05 ENCOUNTER — APPOINTMENT (OUTPATIENT)
Dept: CARDIOLOGY | Facility: CLINIC | Age: 58
End: 2018-02-05

## 2018-04-03 ENCOUNTER — RX RENEWAL (OUTPATIENT)
Age: 58
End: 2018-04-03

## 2018-04-25 ENCOUNTER — APPOINTMENT (OUTPATIENT)
Dept: CARDIOLOGY | Facility: CLINIC | Age: 58
End: 2018-04-25
Payer: COMMERCIAL

## 2018-04-25 ENCOUNTER — NON-APPOINTMENT (OUTPATIENT)
Age: 58
End: 2018-04-25

## 2018-04-25 VITALS
WEIGHT: 160 LBS | RESPIRATION RATE: 17 BRPM | BODY MASS INDEX: 25.11 KG/M2 | SYSTOLIC BLOOD PRESSURE: 126 MMHG | DIASTOLIC BLOOD PRESSURE: 87 MMHG | OXYGEN SATURATION: 97 % | HEIGHT: 67 IN | HEART RATE: 99 BPM

## 2018-04-25 PROCEDURE — 99214 OFFICE O/P EST MOD 30 MIN: CPT

## 2018-04-25 PROCEDURE — 93000 ELECTROCARDIOGRAM COMPLETE: CPT

## 2018-07-16 PROBLEM — I35.0 NONRHEUMATIC AORTIC (VALVE) STENOSIS: Chronic | Status: ACTIVE | Noted: 2017-10-23

## 2018-07-16 PROBLEM — E78.5 HYPERLIPIDEMIA, UNSPECIFIED: Chronic | Status: INACTIVE | Noted: 2017-10-23 | Resolved: 2018-01-03

## 2018-07-18 ENCOUNTER — NON-APPOINTMENT (OUTPATIENT)
Age: 58
End: 2018-07-18

## 2018-07-18 ENCOUNTER — APPOINTMENT (OUTPATIENT)
Dept: CARDIOLOGY | Facility: CLINIC | Age: 58
End: 2018-07-18
Payer: COMMERCIAL

## 2018-07-18 ENCOUNTER — TRANSCRIPTION ENCOUNTER (OUTPATIENT)
Age: 58
End: 2018-07-18

## 2018-07-18 VITALS
SYSTOLIC BLOOD PRESSURE: 117 MMHG | BODY MASS INDEX: 25.11 KG/M2 | RESPIRATION RATE: 17 BRPM | HEIGHT: 67 IN | OXYGEN SATURATION: 97 % | HEART RATE: 61 BPM | DIASTOLIC BLOOD PRESSURE: 80 MMHG | WEIGHT: 160 LBS

## 2018-07-18 PROBLEM — I10 ESSENTIAL (PRIMARY) HYPERTENSION: Chronic | Status: ACTIVE | Noted: 2017-10-23

## 2018-07-18 PROCEDURE — 93000 ELECTROCARDIOGRAM COMPLETE: CPT

## 2018-07-18 PROCEDURE — 99214 OFFICE O/P EST MOD 30 MIN: CPT

## 2018-07-18 RX ORDER — METOPROLOL TARTRATE 25 MG/1
25 TABLET, FILM COATED ORAL
Refills: 0 | Status: DISCONTINUED | COMMUNITY
End: 2018-07-18

## 2018-11-14 ENCOUNTER — APPOINTMENT (OUTPATIENT)
Dept: CARDIOLOGY | Facility: CLINIC | Age: 58
End: 2018-11-14
Payer: COMMERCIAL

## 2018-11-14 ENCOUNTER — NON-APPOINTMENT (OUTPATIENT)
Age: 58
End: 2018-11-14

## 2018-11-14 VITALS
OXYGEN SATURATION: 98 % | WEIGHT: 166 LBS | HEART RATE: 55 BPM | HEIGHT: 67 IN | DIASTOLIC BLOOD PRESSURE: 77 MMHG | BODY MASS INDEX: 26.06 KG/M2 | SYSTOLIC BLOOD PRESSURE: 123 MMHG | RESPIRATION RATE: 16 BRPM

## 2018-11-14 PROCEDURE — 99214 OFFICE O/P EST MOD 30 MIN: CPT

## 2018-11-14 PROCEDURE — 93000 ELECTROCARDIOGRAM COMPLETE: CPT

## 2018-11-14 NOTE — PHYSICAL EXAM
[General Appearance - Well Developed] : well developed [Normal Appearance] : normal appearance [Well Groomed] : well groomed [General Appearance - Well Nourished] : well nourished [No Deformities] : no deformities [General Appearance - In No Acute Distress] : no acute distress [Normal Conjunctiva] : the conjunctiva exhibited no abnormalities [Eyelids - No Xanthelasma] : the eyelids demonstrated no xanthelasmas [Normal Oral Mucosa] : normal oral mucosa [No Oral Pallor] : no oral pallor [No Oral Cyanosis] : no oral cyanosis [Normal Jugular Venous A Waves Present] : normal jugular venous A waves present [Normal Jugular Venous V Waves Present] : normal jugular venous V waves present [No Jugular Venous Freeman A Waves] : no jugular venous freeman A waves [Respiration, Rhythm And Depth] : normal respiratory rhythm and effort [Exaggerated Use Of Accessory Muscles For Inspiration] : no accessory muscle use [Auscultation Breath Sounds / Voice Sounds] : lungs were clear to auscultation bilaterally [Abdomen Soft] : soft [Abdomen Tenderness] : non-tender [Abdomen Mass (___ Cm)] : no abdominal mass palpated [Abnormal Walk] : normal gait [Gait - Sufficient For Exercise Testing] : the gait was sufficient for exercise testing [Nail Clubbing] : no clubbing of the fingernails [Cyanosis, Localized] : no localized cyanosis [Petechial Hemorrhages (___cm)] : no petechial hemorrhages [Skin Color & Pigmentation] : normal skin color and pigmentation [] : no rash [No Venous Stasis] : no venous stasis [Skin Lesions] : no skin lesions [No Skin Ulcers] : no skin ulcer [No Xanthoma] : no  xanthoma was observed [Oriented To Time, Place, And Person] : oriented to person, place, and time [Affect] : the affect was normal [Mood] : the mood was normal [No Anxiety] : not feeling anxious [Normal Rate] : normal [Normal S1] : normal S1 [Normal S2] : normal S2 [No Murmur] : no murmurs heard [II] : a grade 2 [2+] : left 2+ [No Abnormalities] : the abdominal aorta was not enlarged and no bruit was heard [No Pitting Edema] : no pitting edema present [S3] : no S3 [S4] : no S4 [Right Carotid Bruit] : no bruit heard over the right carotid [Left Carotid Bruit] : no bruit heard over the left carotid [Right Femoral Bruit] : no bruit heard over the right femoral artery [Left Femoral Bruit] : no bruit heard over the left femoral artery

## 2018-11-14 NOTE — DISCUSSION/SUMMARY
[Hyperlipidemia] : hyperlipidemia [Diet Modification] : diet modification [Exercise] : exercise [Hypertension] : hypertension [Responding to Treatment] : responding to treatment [Not Responding to Treatment] : not responding to treatment [None] : none [Improving] : improving [Minutes spent___] : for [unfilled] ~Uminutes [Unlikely Cardiac Ischemia (Low Prob.)] : chest pain unlikely to represent cardiac ischemia (low probability) [GERD] : gastroesophageal reflux disease [Musculoskeletal Chest Pain] : musculoskeletal chest pain [Echocardiogram] : echocardiogram [de-identified] : toprol xl 100 am, 50 pm [de-identified] : s/p AVR [de-identified] : jack villareal [de-identified] : asa 81 mg [de-identified] : dizziness

## 2018-11-14 NOTE — REASON FOR VISIT
[Follow-Up - Clinic] : a clinic follow-up of [Aortic Stenosis] : aortic stenosis [Hypertension] : hypertension [FreeTextEntry2] : s/p AVR, aorta surgery, pt c/o regurgitation in chest, burning smell [FreeTextEntry1] : no sscp or benites

## 2018-11-28 ENCOUNTER — APPOINTMENT (OUTPATIENT)
Dept: CARDIOLOGY | Facility: CLINIC | Age: 58
End: 2018-11-28
Payer: COMMERCIAL

## 2018-11-28 PROCEDURE — 93306 TTE W/DOPPLER COMPLETE: CPT

## 2018-12-20 ENCOUNTER — TRANSCRIPTION ENCOUNTER (OUTPATIENT)
Age: 58
End: 2018-12-20

## 2018-12-21 ENCOUNTER — OUTPATIENT (OUTPATIENT)
Dept: OUTPATIENT SERVICES | Facility: HOSPITAL | Age: 58
LOS: 1 days | End: 2018-12-21
Payer: COMMERCIAL

## 2018-12-21 DIAGNOSIS — K62.5 HEMORRHAGE OF ANUS AND RECTUM: ICD-10-CM

## 2018-12-21 DIAGNOSIS — Z95.2 PRESENCE OF PROSTHETIC HEART VALVE: Chronic | ICD-10-CM

## 2018-12-21 PROCEDURE — G0121: CPT

## 2019-05-01 ENCOUNTER — RX RENEWAL (OUTPATIENT)
Age: 59
End: 2019-05-01

## 2019-05-15 ENCOUNTER — APPOINTMENT (OUTPATIENT)
Dept: CARDIOLOGY | Facility: CLINIC | Age: 59
End: 2019-05-15
Payer: COMMERCIAL

## 2019-05-15 ENCOUNTER — NON-APPOINTMENT (OUTPATIENT)
Age: 59
End: 2019-05-15

## 2019-05-15 VITALS
HEART RATE: 60 BPM | SYSTOLIC BLOOD PRESSURE: 130 MMHG | OXYGEN SATURATION: 100 % | HEIGHT: 67 IN | BODY MASS INDEX: 26.37 KG/M2 | RESPIRATION RATE: 17 BRPM | DIASTOLIC BLOOD PRESSURE: 73 MMHG | WEIGHT: 168 LBS

## 2019-05-15 PROCEDURE — 93000 ELECTROCARDIOGRAM COMPLETE: CPT

## 2019-05-15 PROCEDURE — 99214 OFFICE O/P EST MOD 30 MIN: CPT

## 2019-05-15 NOTE — DISCUSSION/SUMMARY
[Unlikely Cardiac Ischemia (Low Prob.)] : chest pain unlikely to represent cardiac ischemia (low probability) [GERD] : gastroesophageal reflux disease [Musculoskeletal Chest Pain] : musculoskeletal chest pain [Not Responding to Treatment] : not responding to treatment [Hyperlipidemia] : hyperlipidemia [Exercise] : exercise [Diet Modification] : diet modification [Hypertension] : hypertension [Responding to Treatment] : responding to treatment [Minutes spent___] : for [unfilled] ~Uminutes [None] : none [Improving] : improving [de-identified] : toprol xl 100 am, 50 pm [de-identified] : s/p AVR [de-identified] : asa 81 mg [de-identified] : dizziness

## 2019-05-15 NOTE — PHYSICAL EXAM
[General Appearance - Well Developed] : well developed [Well Groomed] : well groomed [General Appearance - Well Nourished] : well nourished [Normal Appearance] : normal appearance [General Appearance - In No Acute Distress] : no acute distress [No Deformities] : no deformities [Normal Conjunctiva] : the conjunctiva exhibited no abnormalities [Eyelids - No Xanthelasma] : the eyelids demonstrated no xanthelasmas [Normal Oral Mucosa] : normal oral mucosa [Normal Jugular Venous A Waves Present] : normal jugular venous A waves present [No Oral Cyanosis] : no oral cyanosis [No Oral Pallor] : no oral pallor [Normal Jugular Venous V Waves Present] : normal jugular venous V waves present [No Jugular Venous Freeman A Waves] : no jugular venous freeman A waves [Exaggerated Use Of Accessory Muscles For Inspiration] : no accessory muscle use [Respiration, Rhythm And Depth] : normal respiratory rhythm and effort [Abdomen Soft] : soft [Auscultation Breath Sounds / Voice Sounds] : lungs were clear to auscultation bilaterally [Abdomen Tenderness] : non-tender [Abdomen Mass (___ Cm)] : no abdominal mass palpated [Abnormal Walk] : normal gait [Gait - Sufficient For Exercise Testing] : the gait was sufficient for exercise testing [Nail Clubbing] : no clubbing of the fingernails [Cyanosis, Localized] : no localized cyanosis [Petechial Hemorrhages (___cm)] : no petechial hemorrhages [] : no rash [Skin Color & Pigmentation] : normal skin color and pigmentation [No Venous Stasis] : no venous stasis [Skin Lesions] : no skin lesions [No Skin Ulcers] : no skin ulcer [No Xanthoma] : no  xanthoma was observed [Mood] : the mood was normal [Oriented To Time, Place, And Person] : oriented to person, place, and time [Affect] : the affect was normal [No Anxiety] : not feeling anxious [Normal S1] : normal S1 [Normal Rate] : normal [Normal S2] : normal S2 [No Murmur] : no murmurs heard [II] : a grade 2 [2+] : left 2+ [No Pitting Edema] : no pitting edema present [No Abnormalities] : the abdominal aorta was not enlarged and no bruit was heard [S3] : no S3 [S4] : no S4 [Right Carotid Bruit] : no bruit heard over the right carotid [Right Femoral Bruit] : no bruit heard over the right femoral artery [Left Carotid Bruit] : no bruit heard over the left carotid [Left Femoral Bruit] : no bruit heard over the left femoral artery

## 2019-05-15 NOTE — REASON FOR VISIT
[Hypertension] : hypertension [Follow-Up - Clinic] : a clinic follow-up of [Aortic Stenosis] : aortic stenosis [FreeTextEntry2] : s/p AVR, aorta surgery, no complaints [FreeTextEntry1] : no sscp or benites

## 2019-06-24 ENCOUNTER — TRANSCRIPTION ENCOUNTER (OUTPATIENT)
Age: 59
End: 2019-06-24

## 2019-09-06 ENCOUNTER — EMERGENCY (EMERGENCY)
Facility: HOSPITAL | Age: 59
LOS: 1 days | Discharge: HOME | End: 2019-09-06
Attending: EMERGENCY MEDICINE
Payer: COMMERCIAL

## 2019-09-06 VITALS
SYSTOLIC BLOOD PRESSURE: 166 MMHG | TEMPERATURE: 98 F | HEIGHT: 67 IN | DIASTOLIC BLOOD PRESSURE: 86 MMHG | OXYGEN SATURATION: 97 % | HEART RATE: 81 BPM | RESPIRATION RATE: 20 BRPM | WEIGHT: 162.92 LBS

## 2019-09-06 DIAGNOSIS — Z95.2 PRESENCE OF PROSTHETIC HEART VALVE: Chronic | ICD-10-CM

## 2019-09-06 PROCEDURE — 99283 EMERGENCY DEPT VISIT LOW MDM: CPT

## 2019-09-06 RX ORDER — ACETAMINOPHEN 500 MG
975 TABLET ORAL ONCE
Refills: 0 | Status: COMPLETED | OUTPATIENT
Start: 2019-09-06 | End: 2019-09-06

## 2019-09-06 RX ORDER — IBUPROFEN 200 MG
600 TABLET ORAL ONCE
Refills: 0 | Status: COMPLETED | OUTPATIENT
Start: 2019-09-06 | End: 2019-09-06

## 2019-09-06 RX ADMIN — Medication 600 MILLIGRAM(S): at 18:26

## 2019-09-06 RX ADMIN — Medication 975 MILLIGRAM(S): at 18:26

## 2019-09-06 NOTE — ED PROVIDER NOTE - PATIENT PORTAL LINK FT
You can access the FollowMyHealth Patient Portal offered by [facility] by registering at the following website: [email or web address]. By joining [employer]’s FollowMyHealth portal, you will also be able to view your health information using other applications (apps) compatible with our system.
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
## 2019-09-06 NOTE — ED PROVIDER NOTE - NSFOLLOWUPINSTRUCTIONS_ED_ALL_ED_FT
rest no lifting or straining. tylenol or motrin for pain.  Follow up with orthopedic spine if low back pain does not resolve.   Return to ed for increased pain or new complaint.
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
## 2020-01-01 NOTE — PHYSICAL THERAPY INITIAL EVALUATION ADULT - ADDITIONAL COMMENTS
[facility] delivery Echo 1/18/18: Small thickening of anterior leaftlet tips. Long and excess chordae on anterior leaflet. Mild mitral regurgitation. Bicuspid aortic valve with calcifications. Moderate-severe aortic stenosis.  Likely severe. Mild aortic regurgitation, eccentric jet. Mild right ventricular enlargement.    Pt states he lives with his wife in a private house with 1 steps to enter and 1 flight of stairs to the bedroom, +HR. Pt states his wife is available to assist when needed. Pt states he does not use a AD for ambulation.

## 2020-01-27 ENCOUNTER — APPOINTMENT (OUTPATIENT)
Dept: CARDIOLOGY | Facility: CLINIC | Age: 60
End: 2020-01-27

## 2020-03-09 ENCOUNTER — APPOINTMENT (OUTPATIENT)
Dept: CARDIOLOGY | Facility: CLINIC | Age: 60
End: 2020-03-09
Payer: COMMERCIAL

## 2020-03-09 VITALS
OXYGEN SATURATION: 98 % | RESPIRATION RATE: 17 BRPM | BODY MASS INDEX: 26.37 KG/M2 | HEIGHT: 67 IN | DIASTOLIC BLOOD PRESSURE: 72 MMHG | HEART RATE: 79 BPM | SYSTOLIC BLOOD PRESSURE: 103 MMHG | WEIGHT: 168 LBS

## 2020-03-09 PROCEDURE — 99215 OFFICE O/P EST HI 40 MIN: CPT

## 2020-03-09 PROCEDURE — 93000 ELECTROCARDIOGRAM COMPLETE: CPT

## 2020-03-09 NOTE — REASON FOR VISIT
[Follow-Up - Clinic] : a clinic follow-up of [Aortic Stenosis] : aortic stenosis [Hypertension] : hypertension [FreeTextEntry2] : s/p AVR, aorta surgery, no complaints [FreeTextEntry1] : no sscp or benites

## 2020-03-09 NOTE — DISCUSSION/SUMMARY
[Unlikely Cardiac Ischemia (Low Prob.)] : chest pain unlikely to represent cardiac ischemia (low probability) [GERD] : gastroesophageal reflux disease [Musculoskeletal Chest Pain] : musculoskeletal chest pain [Hyperlipidemia] : hyperlipidemia [Not Responding to Treatment] : not responding to treatment [Diet Modification] : diet modification [Exercise] : exercise [Hypertension] : hypertension [Responding to Treatment] : responding to treatment [None] : none [Improving] : improving [Minutes spent___] : for [unfilled] ~Uminutes [de-identified] : toprol xl 100 am, 50 pm [de-identified] : s/p AVR [de-identified] : asa 81 mg [de-identified] : dizziness

## 2020-03-16 ENCOUNTER — APPOINTMENT (OUTPATIENT)
Dept: CARDIOLOGY | Facility: CLINIC | Age: 60
End: 2020-03-16
Payer: COMMERCIAL

## 2020-03-16 PROCEDURE — 93306 TTE W/DOPPLER COMPLETE: CPT

## 2020-06-26 ENCOUNTER — TRANSCRIPTION ENCOUNTER (OUTPATIENT)
Age: 60
End: 2020-06-26

## 2020-09-09 NOTE — PHYSICAL THERAPY INITIAL EVALUATION ADULT - TRANSFER SKILLS, REHAB EVAL
Email from patient:  I'm wondering since Bharath isn't in hospitals or clinics at the moment, who knows what he'll do next, but for the time being if he moved back into the house? If he needs to go to interviews or he needs to meet with other people WOULD IT BE SAFE if we kept a safe distance from each other in the house - minimum of 10 feet, had an air purifier that runs every 12 minutes in the living room and my bedroom, and we were to sleep in separate bedrooms? If he didn't need to go anywhere for 14 days it would be nice to spend some snuggle time! I don't want to risk my lungs but I do miss having him around.     Discussed with Dr. Campbell, fine for patient's  to move back in as long as observing social distancing precautions. Details discussed with patient wear mask around other people, 6 feet minimum social distancing, no hand washing).    is going to [employer] for a deliver next week. Will Quarantine at sister's house for 7 days, then quarantine at home (different levels of the house, no shared spaces, social distancing) for 7 days      Patient due for lab check, will send orders to [facility] - due for labs.    Flu shot - patient plans on getting in October. Per patient request, 10/13 appt was converted to video visit.     Patient will call with additional questions/concerns.    independent

## 2020-09-24 ENCOUNTER — NON-APPOINTMENT (OUTPATIENT)
Age: 60
End: 2020-09-24

## 2020-09-24 ENCOUNTER — APPOINTMENT (OUTPATIENT)
Dept: CARDIOLOGY | Facility: CLINIC | Age: 60
End: 2020-09-24
Payer: COMMERCIAL

## 2020-09-24 VITALS
OXYGEN SATURATION: 98 % | HEIGHT: 67 IN | BODY MASS INDEX: 26.37 KG/M2 | DIASTOLIC BLOOD PRESSURE: 85 MMHG | WEIGHT: 168 LBS | SYSTOLIC BLOOD PRESSURE: 124 MMHG | RESPIRATION RATE: 20 BRPM | TEMPERATURE: 97.3 F | HEART RATE: 67 BPM

## 2020-09-24 PROCEDURE — 99214 OFFICE O/P EST MOD 30 MIN: CPT

## 2020-09-24 PROCEDURE — 93000 ELECTROCARDIOGRAM COMPLETE: CPT

## 2020-09-24 NOTE — DISCUSSION/SUMMARY
[Unlikely Cardiac Ischemia (Low Prob.)] : chest pain unlikely to represent cardiac ischemia (low probability) [GERD] : gastroesophageal reflux disease [Musculoskeletal Chest Pain] : musculoskeletal chest pain [Hyperlipidemia] : hyperlipidemia [Not Responding to Treatment] : not responding to treatment [Diet Modification] : diet modification [Exercise] : exercise [Hypertension] : hypertension [Responding to Treatment] : responding to treatment [None] : none [Improving] : improving [Minutes spent___] : for [unfilled] ~Uminutes [Echocardiogram] : echocardiogram [de-identified] : toprol xl 100 am, 50 pm [de-identified] : s/p AVR [de-identified] : asa 81 mg [de-identified] : dizziness

## 2020-09-24 NOTE — PHYSICAL EXAM
[General Appearance - Well Developed] : well developed [Normal Appearance] : normal appearance [Well Groomed] : well groomed [General Appearance - Well Nourished] : well nourished [No Deformities] : no deformities [General Appearance - In No Acute Distress] : no acute distress [Normal Conjunctiva] : the conjunctiva exhibited no abnormalities [Eyelids - No Xanthelasma] : the eyelids demonstrated no xanthelasmas [Normal Oral Mucosa] : normal oral mucosa [No Oral Pallor] : no oral pallor [No Oral Cyanosis] : no oral cyanosis [Normal Jugular Venous A Waves Present] : normal jugular venous A waves present [Normal Jugular Venous V Waves Present] : normal jugular venous V waves present [No Jugular Venous Freeman A Waves] : no jugular venous freeman A waves [Abnormal Walk] : normal gait [Gait - Sufficient For Exercise Testing] : the gait was sufficient for exercise testing [Nail Clubbing] : no clubbing of the fingernails [Cyanosis, Localized] : no localized cyanosis [Petechial Hemorrhages (___cm)] : no petechial hemorrhages [Skin Color & Pigmentation] : normal skin color and pigmentation [] : no rash [No Venous Stasis] : no venous stasis [Skin Lesions] : no skin lesions [No Skin Ulcers] : no skin ulcer [No Xanthoma] : no  xanthoma was observed [Oriented To Time, Place, And Person] : oriented to person, place, and time [Affect] : the affect was normal [Mood] : the mood was normal [No Anxiety] : not feeling anxious

## 2021-02-15 NOTE — ED ADULT NURSE NOTE - CAS TRG GEN SKIN CONDITION
I think she should continue her PPI.  The initial response to the dicyclomine may indicate that she is being underdosed.  I would increase the Bentyl to 20 mg t.i.d..  She can call with follow-up in 2-4 weeks.  If symptoms continue we can either change to a different anticholinergic or proceed with EGD for further evaluation to make sure is no evidence of gastritis or ulcer   Warm/Dry

## 2021-03-24 ENCOUNTER — NON-APPOINTMENT (OUTPATIENT)
Age: 61
End: 2021-03-24

## 2021-03-24 ENCOUNTER — APPOINTMENT (OUTPATIENT)
Dept: CARDIOLOGY | Facility: CLINIC | Age: 61
End: 2021-03-24
Payer: COMMERCIAL

## 2021-03-24 VITALS
WEIGHT: 165 LBS | RESPIRATION RATE: 20 BRPM | DIASTOLIC BLOOD PRESSURE: 74 MMHG | SYSTOLIC BLOOD PRESSURE: 114 MMHG | TEMPERATURE: 96.6 F | OXYGEN SATURATION: 99 % | BODY MASS INDEX: 25.9 KG/M2 | HEART RATE: 64 BPM | HEIGHT: 67 IN

## 2021-03-24 PROCEDURE — 93000 ELECTROCARDIOGRAM COMPLETE: CPT

## 2021-03-24 PROCEDURE — 99072 ADDL SUPL MATRL&STAF TM PHE: CPT

## 2021-03-24 PROCEDURE — 99214 OFFICE O/P EST MOD 30 MIN: CPT

## 2021-03-24 NOTE — REASON FOR VISIT
[Follow-Up - Clinic] : a clinic follow-up of [Aortic Stenosis] : aortic stenosis [Hypertension] : hypertension [FreeTextEntry2] : s/p AVR, aorta surgery, no complaints, occasional brief left sscp, lasts seconds at rest a few days a week, for 6 months, no sob, pt has occas gerd [FreeTextEntry1] : no sscp or benites

## 2021-03-24 NOTE — DISCUSSION/SUMMARY
[Unlikely Cardiac Ischemia (Low Prob.)] : chest pain unlikely to represent cardiac ischemia (low probability) [GERD] : gastroesophageal reflux disease [Musculoskeletal Chest Pain] : musculoskeletal chest pain [Hyperlipidemia] : hyperlipidemia [Not Responding to Treatment] : not responding to treatment [Diet Modification] : diet modification [Exercise] : exercise [Hypertension] : hypertension [Responding to Treatment] : responding to treatment [None] : none [Improving] : improving [Minutes spent___] : for [unfilled] ~Uminutes [Echocardiogram] : echocardiogram [de-identified] : toprol xl 100 am, 50 pm [de-identified] : s/p AVR [de-identified] : future stress test [de-identified] : asa 81 mg [de-identified] : dizziness

## 2021-03-26 ENCOUNTER — TRANSCRIPTION ENCOUNTER (OUTPATIENT)
Age: 61
End: 2021-03-26

## 2021-03-28 LAB
ALBUMIN SERPL ELPH-MCNC: 4.8 G/DL
ALP BLD-CCNC: 73 U/L
ALT SERPL-CCNC: 20 U/L
ANION GAP SERPL CALC-SCNC: 11 MMOL/L
AST SERPL-CCNC: 24 U/L
BILIRUB SERPL-MCNC: 0.4 MG/DL
BUN SERPL-MCNC: 17 MG/DL
CALCIUM SERPL-MCNC: 9.6 MG/DL
CHLORIDE SERPL-SCNC: 106 MMOL/L
CHOLEST SERPL-MCNC: 221 MG/DL
CO2 SERPL-SCNC: 26 MMOL/L
CREAT SERPL-MCNC: 1.18 MG/DL
HDLC SERPL-MCNC: 35 MG/DL
LDLC SERPL CALC-MCNC: 148 MG/DL
NONHDLC SERPL-MCNC: 187 MG/DL
POTASSIUM SERPL-SCNC: 5.1 MMOL/L
PROT SERPL-MCNC: 7.3 G/DL
SODIUM SERPL-SCNC: 143 MMOL/L
TRIGL SERPL-MCNC: 192 MG/DL

## 2021-03-29 DIAGNOSIS — Z86.39 PERSONAL HISTORY OF OTHER ENDOCRINE, NUTRITIONAL AND METABOLIC DISEASE: ICD-10-CM

## 2021-04-12 ENCOUNTER — APPOINTMENT (OUTPATIENT)
Dept: CARDIOLOGY | Facility: CLINIC | Age: 61
End: 2021-04-12

## 2021-09-02 ENCOUNTER — NON-APPOINTMENT (OUTPATIENT)
Age: 61
End: 2021-09-02

## 2021-09-02 ENCOUNTER — APPOINTMENT (OUTPATIENT)
Dept: CARDIOLOGY | Facility: CLINIC | Age: 61
End: 2021-09-02
Payer: SELF-PAY

## 2021-09-02 ENCOUNTER — APPOINTMENT (OUTPATIENT)
Dept: CARDIOLOGY | Facility: CLINIC | Age: 61
End: 2021-09-02
Payer: COMMERCIAL

## 2021-09-02 VITALS
SYSTOLIC BLOOD PRESSURE: 135 MMHG | BODY MASS INDEX: 25.43 KG/M2 | WEIGHT: 162 LBS | HEART RATE: 70 BPM | RESPIRATION RATE: 20 BRPM | OXYGEN SATURATION: 98 % | DIASTOLIC BLOOD PRESSURE: 86 MMHG | HEIGHT: 67 IN | TEMPERATURE: 96.6 F

## 2021-09-02 DIAGNOSIS — R07.9 CHEST PAIN, UNSPECIFIED: ICD-10-CM

## 2021-09-02 DIAGNOSIS — M54.32 SCIATICA, LEFT SIDE: ICD-10-CM

## 2021-09-02 PROCEDURE — 99072 ADDL SUPL MATRL&STAF TM PHE: CPT

## 2021-09-02 PROCEDURE — 93000 ELECTROCARDIOGRAM COMPLETE: CPT

## 2021-09-02 PROCEDURE — 93306 TTE W/DOPPLER COMPLETE: CPT

## 2021-09-02 PROCEDURE — 99215 OFFICE O/P EST HI 40 MIN: CPT

## 2021-09-02 NOTE — DISCUSSION/SUMMARY
[GERD] : gastroesophageal reflux disease [Musculoskeletal Chest Pain] : musculoskeletal chest pain [Echocardiogram] : echocardiogram [Hyperlipidemia] : hyperlipidemia [Not Responding to Treatment] : not responding to treatment [Diet Modification] : diet modification [Exercise] : exercise [Hypertension] : hypertension [Responding to Treatment] : responding to treatment [Improving] : improving [Minutes Spent: ___] : for [unfilled] ~Uminutes [Adenosine Stress Test] : adenosine stress test [de-identified] : toprol xl 100 am, 50 pm [de-identified] : dizziness

## 2021-09-02 NOTE — REASON FOR VISIT
[Follow-Up - Clinic] : a clinic follow-up of [Aortic Stenosis] : aortic stenosis [Hypertension] : hypertension [FreeTextEntry2] : s/p AVR, aorta surgery, pt has "acid reflux" during activity and increase dyspnea on ecertion , past 6 months [FreeTextEntry1] : no sscp or benites

## 2021-09-02 NOTE — PHYSICAL EXAM
[General Appearance - Well Developed] : well developed [Normal Appearance] : normal appearance [Well Groomed] : well groomed [General Appearance - Well Nourished] : well nourished [No Deformities] : no deformities [General Appearance - In No Acute Distress] : no acute distress [Normal Conjunctiva] : the conjunctiva exhibited no abnormalities [Eyelids - No Xanthelasma] : the eyelids demonstrated no xanthelasmas [Normal Oral Mucosa] : normal oral mucosa [No Oral Pallor] : no oral pallor [No Oral Cyanosis] : no oral cyanosis [Normal Jugular Venous A Waves Present] : normal jugular venous A waves present [Normal Jugular Venous V Waves Present] : normal jugular venous V waves present [No Jugular Venous Freeman A Waves] : no jugular venous freeman A waves [Cyanosis, Localized] : no localized cyanosis [Nail Clubbing] : no clubbing of the fingernails [Petechial Hemorrhages (___cm)] : no petechial hemorrhages [Skin Color & Pigmentation] : normal skin color and pigmentation [] : no rash [No Venous Stasis] : no venous stasis [Skin Lesions] : no skin lesions [No Skin Ulcers] : no skin ulcer [No Xanthoma] : no  xanthoma was observed [Oriented To Time, Place, And Person] : oriented to person, place, and time [Mood] : the mood was normal [Affect] : the affect was normal [No Anxiety] : not feeling anxious [FreeTextEntry1] : left leg sciatica, recent steroid rx, cant walk on treadmill

## 2021-10-07 ENCOUNTER — APPOINTMENT (OUTPATIENT)
Dept: CARDIOLOGY | Facility: CLINIC | Age: 61
End: 2021-10-07
Payer: COMMERCIAL

## 2021-10-07 PROCEDURE — 93015 CV STRESS TEST SUPVJ I&R: CPT

## 2021-10-07 PROCEDURE — 99072 ADDL SUPL MATRL&STAF TM PHE: CPT

## 2021-10-07 PROCEDURE — A9500: CPT

## 2021-10-07 PROCEDURE — 78452 HT MUSCLE IMAGE SPECT MULT: CPT

## 2022-12-18 ENCOUNTER — RX RENEWAL (OUTPATIENT)
Age: 62
End: 2022-12-18

## 2023-01-24 ENCOUNTER — TRANSCRIPTION ENCOUNTER (OUTPATIENT)
Age: 63
End: 2023-01-24
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
## 2023-06-29 ENCOUNTER — APPOINTMENT (OUTPATIENT)
Dept: CARDIOLOGY | Facility: CLINIC | Age: 63
End: 2023-06-29
Payer: COMMERCIAL

## 2023-06-29 ENCOUNTER — NON-APPOINTMENT (OUTPATIENT)
Age: 63
End: 2023-06-29

## 2023-06-29 VITALS
RESPIRATION RATE: 17 BRPM | DIASTOLIC BLOOD PRESSURE: 91 MMHG | WEIGHT: 161 LBS | SYSTOLIC BLOOD PRESSURE: 143 MMHG | HEART RATE: 69 BPM | BODY MASS INDEX: 25.27 KG/M2 | HEIGHT: 67 IN | OXYGEN SATURATION: 97 %

## 2023-06-29 DIAGNOSIS — R06.00 DYSPNEA, UNSPECIFIED: ICD-10-CM

## 2023-06-29 DIAGNOSIS — Z00.00 ENCOUNTER FOR GENERAL ADULT MEDICAL EXAMINATION W/OUT ABNORMAL FINDINGS: ICD-10-CM

## 2023-06-29 DIAGNOSIS — I35.0 NONRHEUMATIC AORTIC (VALVE) STENOSIS: ICD-10-CM

## 2023-06-29 PROCEDURE — 93000 ELECTROCARDIOGRAM COMPLETE: CPT

## 2023-06-29 PROCEDURE — 99215 OFFICE O/P EST HI 40 MIN: CPT

## 2023-06-29 RX ORDER — AMLODIPINE BESYLATE 5 MG/1
5 TABLET ORAL DAILY
Qty: 90 | Refills: 0 | Status: DISCONTINUED | COMMUNITY
Start: 2020-09-24 | End: 2023-06-29

## 2023-06-29 NOTE — REASON FOR VISIT
[Follow-Up - Clinic] : a clinic follow-up of [Aortic Stenosis] : aortic stenosis [Hypertension] : hypertension [FreeTextEntry2] : s/p AVR, aorta surgery, pt feels some dyspnea up inclines past 6 months, no sscp, does peleton no issues, occas reflux sx [FreeTextEntry1] : last seen 9/2021

## 2023-06-29 NOTE — DISCUSSION/SUMMARY
[GERD] : gastroesophageal reflux disease [Musculoskeletal Chest Pain] : musculoskeletal chest pain [Adenosine Stress Test] : adenosine stress test [Hyperlipidemia] : hyperlipidemia [Not Responding to Treatment] : not responding to treatment [Diet Modification] : diet modification [Exercise] : exercise [Hypertension] : hypertension [Responding to Treatment] : responding to treatment [Improving] : improving [Minutes Spent: ___] : for [unfilled] ~Uminutes [Echocardiogram] : an echocardiogram [de-identified] : toprol xl 100 am, 50 pm [de-identified] : dizziness [FreeTextEntry2] : last seen 2 years ago

## 2023-06-29 NOTE — PHYSICAL EXAM
[Normal Appearance] : normal appearance [General Appearance - Well Developed] : well developed [Well Groomed] : well groomed [General Appearance - Well Nourished] : well nourished [No Deformities] : no deformities [General Appearance - In No Acute Distress] : no acute distress [Normal Conjunctiva] : the conjunctiva exhibited no abnormalities [Eyelids - No Xanthelasma] : the eyelids demonstrated no xanthelasmas [Normal Oral Mucosa] : normal oral mucosa [No Oral Pallor] : no oral pallor [No Oral Cyanosis] : no oral cyanosis [Normal Jugular Venous A Waves Present] : normal jugular venous A waves present [Normal Jugular Venous V Waves Present] : normal jugular venous V waves present [No Jugular Venous Freeman A Waves] : no jugular venous freeman A waves [Nail Clubbing] : no clubbing of the fingernails [Cyanosis, Localized] : no localized cyanosis [Petechial Hemorrhages (___cm)] : no petechial hemorrhages [Skin Color & Pigmentation] : normal skin color and pigmentation [] : no rash [No Venous Stasis] : no venous stasis [Skin Lesions] : no skin lesions [No Skin Ulcers] : no skin ulcer [No Xanthoma] : no  xanthoma was observed [Oriented To Time, Place, And Person] : oriented to person, place, and time [Affect] : the affect was normal [Mood] : the mood was normal [No Anxiety] : not feeling anxious [FreeTextEntry1] : left leg sciatica, recent steroid rx, cant walk on treadmill

## 2023-07-24 ENCOUNTER — APPOINTMENT (OUTPATIENT)
Dept: CARDIOLOGY | Facility: CLINIC | Age: 63
End: 2023-07-24
Payer: COMMERCIAL

## 2023-07-24 DIAGNOSIS — I49.3 VENTRICULAR PREMATURE DEPOLARIZATION: ICD-10-CM

## 2023-07-24 PROCEDURE — 93242 EXT ECG>48HR<7D RECORDING: CPT

## 2023-07-24 PROCEDURE — 93306 TTE W/DOPPLER COMPLETE: CPT

## 2023-08-03 PROCEDURE — 93248 EXT ECG>7D<15D REV&INTERPJ: CPT

## 2023-08-03 PROCEDURE — 93246 EXT ECG>7D<15D RECORDING: CPT

## 2023-11-12 NOTE — H&P PST ADULT - IS PATIENT PREGNANT?
IMPORTANT EVENTS THIS SHIFT: pt a&ox3, up SBA, tolerating CLD, tolerating G-Tube 3:1 clamping, ACHS accucheck, dilaudid, and toradol given for pain with relief, no nausea reported, voiding without difficulties, pt states he is passing gas but no BM noted         IMPORTANT EVENTS COMING UP/GOALS (PLEASE INCLUDE WHITE BOARD AND DISCHARGE BOARD UPDATES):   PATIENT SPECIAL NEEDS/ACCOMMODATIONS:                not applicable (Male)

## 2024-02-07 ENCOUNTER — APPOINTMENT (OUTPATIENT)
Dept: ORTHOPEDIC SURGERY | Facility: CLINIC | Age: 64
End: 2024-02-07
Payer: COMMERCIAL

## 2024-02-07 VITALS — BODY MASS INDEX: 25.11 KG/M2 | HEIGHT: 67 IN | WEIGHT: 160 LBS

## 2024-02-07 DIAGNOSIS — M89.8X1 OTHER SPECIFIED DISORDERS OF BONE, SHOULDER: ICD-10-CM

## 2024-02-07 PROCEDURE — 99204 OFFICE O/P NEW MOD 45 MIN: CPT

## 2024-02-07 PROCEDURE — 73000 X-RAY EXAM OF COLLAR BONE: CPT | Mod: RT

## 2024-02-07 NOTE — ASSESSMENT
----- Message from Ania Posada sent at 2022 10:01 AM CST -----  Contact: Mom [phone]  Would like to receive medical advice.    Symptoms (please be specific):  fussy, not sleeping and spitting up    How long has the patient had these symptoms:  3 weeks    Pharmacy name/number (copy/paste from chart):      [id number] [place] [address] [address] [facility] [place]  [employer]  [facility] [address]  [facility] [address]  Phone: [phone] Fax: [phone]    Would they like a call back or a response via [facility]:  call back    Additional information:  Calling to speak with the nurse regarding advice for pt not sleeping, fussy and spitting up. Mom states formula has been recalled.            [FreeTextEntry1] : The patient is a 63-year-old male with chief complaint of right shoulder pain.  He is right-hand dominant.  3 days ago while snowboarding he took a fall and landed directly onto an outstretched right arm.  He had pain and swelling and bruising of the right arm which is gotten somewhat worse.  He had no previous problems with the right shoulder.  Examination of the right upper extremity reveals normal neurovascular exam.  Examination of the right shoulder reveals bruising and swelling over the midshaft clavicle.  He has some pain and crepitation at the fracture site.  His shoulder motion is nearly full with no pain.  He has no pain at the sternoclavicular joint and no pain at the AC joint.  X-rays done in the office today of the right clavicle 2 views show a midshaft comminuted fracture with displacement of 100%.  There were 2 small butterfly fragments in addition to the 2 main shaft fragments.  The AC joint and sternoclavicular joint are fine.  There is no obvious tumors, masses or calcifications seen.  The plan at this time is to proceed with open reduction and internal fixation of the right clavicle.  All risks, benefits and alternatives of the procedure were discussed with the patient in detail and he wishes to proceed.

## 2024-02-07 NOTE — HISTORY OF PRESENT ILLNESS
[Sports related] : sports related [] : no [FreeTextEntry2] : snow boarding  [FreeTextEntry3] : 2/4/24 [FreeTextEntry5] : Patient here for right collar bone injury due to a snowboarding accident.

## 2024-02-08 ENCOUNTER — APPOINTMENT (OUTPATIENT)
Dept: CARDIOLOGY | Facility: CLINIC | Age: 64
End: 2024-02-08
Payer: COMMERCIAL

## 2024-02-08 ENCOUNTER — NON-APPOINTMENT (OUTPATIENT)
Age: 64
End: 2024-02-08

## 2024-02-08 VITALS
BODY MASS INDEX: 25.11 KG/M2 | SYSTOLIC BLOOD PRESSURE: 183 MMHG | OXYGEN SATURATION: 100 % | WEIGHT: 160 LBS | HEART RATE: 64 BPM | HEIGHT: 67 IN | DIASTOLIC BLOOD PRESSURE: 93 MMHG

## 2024-02-08 VITALS — SYSTOLIC BLOOD PRESSURE: 144 MMHG | DIASTOLIC BLOOD PRESSURE: 90 MMHG

## 2024-02-08 DIAGNOSIS — I10 ESSENTIAL (PRIMARY) HYPERTENSION: ICD-10-CM

## 2024-02-08 DIAGNOSIS — Z95.3 PRESENCE OF XENOGENIC HEART VALVE: ICD-10-CM

## 2024-02-08 DIAGNOSIS — Z78.9 OTHER SPECIFIED HEALTH STATUS: ICD-10-CM

## 2024-02-08 PROCEDURE — 99214 OFFICE O/P EST MOD 30 MIN: CPT

## 2024-02-08 PROCEDURE — 93000 ELECTROCARDIOGRAM COMPLETE: CPT

## 2024-02-08 NOTE — REASON FOR VISIT
[Structural Heart and Valve Disease] : structural heart and valve disease [Hypertension] : hypertension [FreeTextEntry3] : Montserrat [FreeTextEntry1] : Seen prior to orthopedic surgery , clavicular fracture from snowboarding accident.  No history Mi, chf, or angina. Has history of bicuspid aortic valve, s/p SAVR DR. Olson.  NO c/p, sob.

## 2024-02-08 NOTE — DISCUSSION/SUMMARY
[Patient] : the patient [Risks] : risks [Benefits] : benefits [Alternatives] : alternatives [FreeTextEntry1] : Adivsed to take b blocker am of surgery with sip of water. Family/genetic counseling due to nature of bicuspid disease discussed with him as well. Cardio follow up dr young. [EKG obtained to assist in diagnosis and management of assessed problem(s)] : EKG obtained to assist in diagnosis and management of assessed problem(s)

## 2024-02-08 NOTE — ASSESSMENT
[FreeTextEntry1] : Medically/cardiologically stable for or/surgery for cliavicular fracture at low risk. History of HBP and s/p SAVR for bicuspid valve.
17-Jun-2022 11:22

## 2024-02-08 NOTE — PHYSICAL EXAM
[No Gallop] : no gallop [Soft] : abdomen soft [Normal Gait] : normal gait [Normal] : no edema, no cyanosis, no clubbing, no varicosities [Moves all extremities] : moves all extremities [de-identified] : JARRETT [de-identified] : swelling/tender right supraclavicular region

## 2024-02-09 ENCOUNTER — INPATIENT (INPATIENT)
Facility: HOSPITAL | Age: 64
LOS: 0 days | Discharge: HOME | DRG: 517 | End: 2024-02-10
Attending: ORTHOPAEDIC SURGERY | Admitting: ORTHOPAEDIC SURGERY
Payer: COMMERCIAL

## 2024-02-09 ENCOUNTER — TRANSCRIPTION ENCOUNTER (OUTPATIENT)
Age: 64
End: 2024-02-09

## 2024-02-09 VITALS — WEIGHT: 160.06 LBS

## 2024-02-09 DIAGNOSIS — Z95.2 PRESENCE OF PROSTHETIC HEART VALVE: Chronic | ICD-10-CM

## 2024-02-09 DIAGNOSIS — S42.009A FRACTURE OF UNSPECIFIED PART OF UNSPECIFIED CLAVICLE, INITIAL ENCOUNTER FOR CLOSED FRACTURE: ICD-10-CM

## 2024-02-09 LAB
ABO RH CONFIRMATION: SIGNIFICANT CHANGE UP
ANION GAP SERPL CALC-SCNC: 3 MMOL/L — LOW (ref 5–17)
APTT BLD: 26.6 SEC — SIGNIFICANT CHANGE UP (ref 24.5–35.6)
BLD GP AB SCN SERPL QL: SIGNIFICANT CHANGE UP
BUN SERPL-MCNC: 16 MG/DL — SIGNIFICANT CHANGE UP (ref 7–23)
CALCIUM SERPL-MCNC: 9.3 MG/DL — SIGNIFICANT CHANGE UP (ref 8.5–10.1)
CHLORIDE SERPL-SCNC: 110 MMOL/L — HIGH (ref 96–108)
CO2 SERPL-SCNC: 29 MMOL/L — SIGNIFICANT CHANGE UP (ref 22–31)
CREAT SERPL-MCNC: 1.13 MG/DL — SIGNIFICANT CHANGE UP (ref 0.5–1.3)
EGFR: 73 ML/MIN/1.73M2 — SIGNIFICANT CHANGE UP
GLUCOSE SERPL-MCNC: 109 MG/DL — HIGH (ref 70–99)
HCT VFR BLD CALC: 40 % — SIGNIFICANT CHANGE UP (ref 39–50)
HGB BLD-MCNC: 13.1 G/DL — SIGNIFICANT CHANGE UP (ref 13–17)
INR BLD: 0.85 RATIO — SIGNIFICANT CHANGE UP (ref 0.85–1.18)
MCHC RBC-ENTMCNC: 30.8 PG — SIGNIFICANT CHANGE UP (ref 27–34)
MCHC RBC-ENTMCNC: 32.8 GM/DL — SIGNIFICANT CHANGE UP (ref 32–36)
MCV RBC AUTO: 93.9 FL — SIGNIFICANT CHANGE UP (ref 80–100)
PLATELET # BLD AUTO: 278 K/UL — SIGNIFICANT CHANGE UP (ref 150–400)
POTASSIUM SERPL-MCNC: 4.3 MMOL/L — SIGNIFICANT CHANGE UP (ref 3.5–5.3)
POTASSIUM SERPL-SCNC: 4.3 MMOL/L — SIGNIFICANT CHANGE UP (ref 3.5–5.3)
PROTHROM AB SERPL-ACNC: 9.7 SEC — SIGNIFICANT CHANGE UP (ref 9.5–13)
RBC # BLD: 4.26 M/UL — SIGNIFICANT CHANGE UP (ref 4.2–5.8)
RBC # FLD: 12.5 % — SIGNIFICANT CHANGE UP (ref 10.3–14.5)
SODIUM SERPL-SCNC: 142 MMOL/L — SIGNIFICANT CHANGE UP (ref 135–145)
WBC # BLD: 6.69 K/UL — SIGNIFICANT CHANGE UP (ref 3.8–10.5)
WBC # FLD AUTO: 6.69 K/UL — SIGNIFICANT CHANGE UP (ref 3.8–10.5)

## 2024-02-09 PROCEDURE — 85027 COMPLETE CBC AUTOMATED: CPT

## 2024-02-09 PROCEDURE — 86900 BLOOD TYPING SEROLOGIC ABO: CPT

## 2024-02-09 PROCEDURE — 76000 FLUOROSCOPY <1 HR PHYS/QHP: CPT

## 2024-02-09 PROCEDURE — 85730 THROMBOPLASTIN TIME PARTIAL: CPT

## 2024-02-09 PROCEDURE — 71045 X-RAY EXAM CHEST 1 VIEW: CPT | Mod: 26

## 2024-02-09 PROCEDURE — 86803 HEPATITIS C AB TEST: CPT

## 2024-02-09 PROCEDURE — 86901 BLOOD TYPING SEROLOGIC RH(D): CPT

## 2024-02-09 PROCEDURE — 99285 EMERGENCY DEPT VISIT HI MDM: CPT

## 2024-02-09 PROCEDURE — 99053 MED SERV 10PM-8AM 24 HR FAC: CPT

## 2024-02-09 PROCEDURE — 86850 RBC ANTIBODY SCREEN: CPT

## 2024-02-09 PROCEDURE — 80048 BASIC METABOLIC PNL TOTAL CA: CPT

## 2024-02-09 PROCEDURE — 93010 ELECTROCARDIOGRAM REPORT: CPT

## 2024-02-09 PROCEDURE — 71045 X-RAY EXAM CHEST 1 VIEW: CPT

## 2024-02-09 PROCEDURE — 99252 IP/OBS CONSLTJ NEW/EST SF 35: CPT

## 2024-02-09 PROCEDURE — 85610 PROTHROMBIN TIME: CPT

## 2024-02-09 PROCEDURE — 93005 ELECTROCARDIOGRAM TRACING: CPT

## 2024-02-09 PROCEDURE — 86920 COMPATIBILITY TEST SPIN: CPT

## 2024-02-09 PROCEDURE — C1713: CPT

## 2024-02-09 PROCEDURE — 97162 PT EVAL MOD COMPLEX 30 MIN: CPT | Mod: GP

## 2024-02-09 PROCEDURE — 36415 COLL VENOUS BLD VENIPUNCTURE: CPT

## 2024-02-09 PROCEDURE — 97535 SELF CARE MNGMENT TRAINING: CPT | Mod: GO

## 2024-02-09 PROCEDURE — 97166 OT EVAL MOD COMPLEX 45 MIN: CPT | Mod: GO

## 2024-02-09 RX ORDER — MAGNESIUM HYDROXIDE 400 MG/1
30 TABLET, CHEWABLE ORAL DAILY
Refills: 0 | Status: DISCONTINUED | OUTPATIENT
Start: 2024-02-09 | End: 2024-02-10

## 2024-02-09 RX ORDER — ACETAMINOPHEN 500 MG
1000 TABLET ORAL ONCE
Refills: 0 | Status: COMPLETED | OUTPATIENT
Start: 2024-02-09 | End: 2024-02-09

## 2024-02-09 RX ORDER — SODIUM CHLORIDE 9 MG/ML
1000 INJECTION INTRAMUSCULAR; INTRAVENOUS; SUBCUTANEOUS
Refills: 0 | Status: DISCONTINUED | OUTPATIENT
Start: 2024-02-09 | End: 2024-02-10

## 2024-02-09 RX ORDER — HYDROMORPHONE HYDROCHLORIDE 2 MG/ML
0.5 INJECTION INTRAMUSCULAR; INTRAVENOUS; SUBCUTANEOUS
Refills: 0 | Status: DISCONTINUED | OUTPATIENT
Start: 2024-02-09 | End: 2024-02-09

## 2024-02-09 RX ORDER — ONDANSETRON 8 MG/1
4 TABLET, FILM COATED ORAL EVERY 6 HOURS
Refills: 0 | Status: DISCONTINUED | OUTPATIENT
Start: 2024-02-09 | End: 2024-02-10

## 2024-02-09 RX ORDER — SODIUM CHLORIDE 9 MG/ML
1000 INJECTION, SOLUTION INTRAVENOUS
Refills: 0 | Status: DISCONTINUED | OUTPATIENT
Start: 2024-02-09 | End: 2024-02-09

## 2024-02-09 RX ORDER — ASPIRIN/CALCIUM CARB/MAGNESIUM 324 MG
1 TABLET ORAL
Qty: 14 | Refills: 1
Start: 2024-02-09 | End: 2024-03-07

## 2024-02-09 RX ORDER — ONDANSETRON 8 MG/1
4 TABLET, FILM COATED ORAL ONCE
Refills: 0 | Status: DISCONTINUED | OUTPATIENT
Start: 2024-02-09 | End: 2024-02-09

## 2024-02-09 RX ORDER — CEFAZOLIN SODIUM 1 G
2000 VIAL (EA) INJECTION EVERY 8 HOURS
Refills: 0 | Status: DISCONTINUED | OUTPATIENT
Start: 2024-02-09 | End: 2024-02-09

## 2024-02-09 RX ORDER — OXYCODONE HYDROCHLORIDE 5 MG/1
5 TABLET ORAL EVERY 4 HOURS
Refills: 0 | Status: DISCONTINUED | OUTPATIENT
Start: 2024-02-09 | End: 2024-02-10

## 2024-02-09 RX ORDER — METOPROLOL TARTRATE 50 MG
1 TABLET ORAL
Refills: 0 | DISCHARGE

## 2024-02-09 RX ORDER — AMLODIPINE BESYLATE 2.5 MG/1
10 TABLET ORAL DAILY
Refills: 0 | Status: DISCONTINUED | OUTPATIENT
Start: 2024-02-09 | End: 2024-02-10

## 2024-02-09 RX ORDER — AMLODIPINE BESYLATE 2.5 MG/1
1 TABLET ORAL
Refills: 0 | DISCHARGE

## 2024-02-09 RX ORDER — OXYCODONE HYDROCHLORIDE 5 MG/1
10 TABLET ORAL EVERY 4 HOURS
Refills: 0 | Status: DISCONTINUED | OUTPATIENT
Start: 2024-02-09 | End: 2024-02-10

## 2024-02-09 RX ORDER — OXYCODONE HYDROCHLORIDE 5 MG/1
10 TABLET ORAL ONCE
Refills: 0 | Status: DISCONTINUED | OUTPATIENT
Start: 2024-02-09 | End: 2024-02-09

## 2024-02-09 RX ORDER — METOPROLOL TARTRATE 50 MG
100 TABLET ORAL DAILY
Refills: 0 | Status: DISCONTINUED | OUTPATIENT
Start: 2024-02-09 | End: 2024-02-10

## 2024-02-09 RX ORDER — OXYCODONE HYDROCHLORIDE 5 MG/1
1 TABLET ORAL
Qty: 20 | Refills: 0
Start: 2024-02-09 | End: 2024-02-11

## 2024-02-09 RX ORDER — CEFAZOLIN SODIUM 1 G
2000 VIAL (EA) INJECTION EVERY 8 HOURS
Refills: 0 | Status: COMPLETED | OUTPATIENT
Start: 2024-02-09 | End: 2024-02-10

## 2024-02-09 RX ORDER — ACETAMINOPHEN 500 MG
650 TABLET ORAL EVERY 6 HOURS
Refills: 0 | Status: DISCONTINUED | OUTPATIENT
Start: 2024-02-09 | End: 2024-02-10

## 2024-02-09 RX ORDER — HYDROMORPHONE HYDROCHLORIDE 2 MG/ML
1 INJECTION INTRAMUSCULAR; INTRAVENOUS; SUBCUTANEOUS
Refills: 0 | Status: DISCONTINUED | OUTPATIENT
Start: 2024-02-09 | End: 2024-02-09

## 2024-02-09 RX ORDER — ASPIRIN/CALCIUM CARB/MAGNESIUM 324 MG
325 TABLET ORAL DAILY
Refills: 0 | Status: DISCONTINUED | OUTPATIENT
Start: 2024-02-10 | End: 2024-02-10

## 2024-02-09 RX ORDER — SODIUM CHLORIDE 9 MG/ML
1000 INJECTION, SOLUTION INTRAVENOUS
Refills: 0 | Status: DISCONTINUED | OUTPATIENT
Start: 2024-02-09 | End: 2024-02-10

## 2024-02-09 RX ORDER — ASPIRIN/CALCIUM CARB/MAGNESIUM 324 MG
81 TABLET ORAL DAILY
Refills: 0 | Status: DISCONTINUED | OUTPATIENT
Start: 2024-02-09 | End: 2024-02-09

## 2024-02-09 RX ORDER — ASPIRIN/CALCIUM CARB/MAGNESIUM 324 MG
1 TABLET ORAL
Refills: 0 | DISCHARGE

## 2024-02-09 RX ADMIN — HYDROMORPHONE HYDROCHLORIDE 0.5 MILLIGRAM(S): 2 INJECTION INTRAMUSCULAR; INTRAVENOUS; SUBCUTANEOUS at 19:43

## 2024-02-09 RX ADMIN — Medication 400 MILLIGRAM(S): at 14:45

## 2024-02-09 RX ADMIN — OXYCODONE HYDROCHLORIDE 10 MILLIGRAM(S): 5 TABLET ORAL at 21:01

## 2024-02-09 RX ADMIN — SODIUM CHLORIDE 75 MILLILITER(S): 9 INJECTION, SOLUTION INTRAVENOUS at 14:46

## 2024-02-09 RX ADMIN — HYDROMORPHONE HYDROCHLORIDE 0.5 MILLIGRAM(S): 2 INJECTION INTRAMUSCULAR; INTRAVENOUS; SUBCUTANEOUS at 19:30

## 2024-02-09 RX ADMIN — OXYCODONE HYDROCHLORIDE 10 MILLIGRAM(S): 5 TABLET ORAL at 20:17

## 2024-02-09 RX ADMIN — Medication 1000 MILLIGRAM(S): at 14:47

## 2024-02-09 RX ADMIN — SODIUM CHLORIDE 75 MILLILITER(S): 9 INJECTION INTRAMUSCULAR; INTRAVENOUS; SUBCUTANEOUS at 10:34

## 2024-02-09 NOTE — CONSULT NOTE ADULT - SUBJECTIVE AND OBJECTIVE BOX
CHIEF COMPLAINT/INTERVAL HISTORY:    Patient is a 63y old  Male who presents with a chief complaint of     HPI:  Patient is a 63yMale RHD who presents to [place] ED w/ a c/o of right clavicle pain. Patient states that he was snowboarding on 2/4 when he fell and injured his clavicle. Denies Head strike or LOC. States ability to walk immediately following the injury. Denies any numbness or tingling. Denies having any other pain elsewhere. Denies any previous orthopedic history. No other orthopedic concerns at this time.    HTN (hypertension)    HLD (hyperlipidemia)    AS (aortic stenosis) s/p TAVR 2018          PHYSICAL EXAM:  T(C): 36.8 (02-09-24 @ 07:03), Max: 36.8 (02-09-24 @ 07:03)  HR: 64 (02-09-24 @ 07:03) (64 - 64)  BP: 126/89 (02-09-24 @ 07:03) (126/89 - 126/89)  RR: 18 (02-09-24 @ 07:03) (18 - 18)  SpO2: 100% (02-09-24 @ 07:03) (100% - 100%)    PHYSICAL EXAM:      Constitutional: NAD  Eyes: perrl, no conjunctival changes  ENMT: no exudates, moist oral muc, uvula midline  Neck: no JVD, no LAD  Back: no cva tenderness  Respiratory: CTA, no exp wheezes  Cardiovascular: S1S2 reg, no murmur gallop or rub  Gastrointestinal: abd soft, NT/ND + BS  Vascular: pedal pulses + bilateral, warm extremities  Neurological: non focal, mot str 5/5/ all extr  RUE:  Skin: ecchymosis over the right clavicle with TTP  NTTP elsewhere on extremity  Compartments soft and compressible          LABS:                        13.1   6.69  )-----------( 278      ( 09 Feb 2024 07:49 )             40.0     02-09    142  |  110<H>  |  16  ----------------------------<  109<H>  4.3   |  29  |  1.13    Ca    9.3      09 Feb 2024 07:49          A/P: 63 M who presents with a R clavicle fx after snowboarding few days ago:    * Right clavicular fracture  for R clavicle ORIF  low risk for periop complications    * HTN  pt took 100mg metoprolol and Norvasc, 81 ASA this am

## 2024-02-09 NOTE — DISCHARGE NOTE PROVIDER - NSDCMRMEDTOKEN_GEN_ALL_CORE_FT
amLODIPine 10 mg oral tablet: 1 tab(s) orally once a day  Aspirin  mg oral delayed release tablet: 1 tab(s) orally once a day MDD: 1  metoprolol succinate 100 mg oral tablet, extended release: 1 tab(s) orally once a day  oxyCODONE 5 mg oral tablet: 1 tab(s) orally every 4 to 6 hours as needed for  severe pain Take 1-2 pills for severe pain MDD: 10

## 2024-02-09 NOTE — ED PROVIDER NOTE - OBJECTIVE STATEMENT
64 yo male w/PMHx 62 yo male w/PMHx HTN presents to the ED with R clavicle pain. 5 days ago, pt was snowboarding when he fell and felt pain in his R clavicle. No head strike, no LOC, pt is on baby ASA. Pt f/u with Dr. Montserrat TAYLOR and was found to have R clavicle fx and was sent to the ED for surgery.

## 2024-02-09 NOTE — CHART NOTE - NSCHARTNOTEFT_GEN_A_CORE
POST-OPERATIVE SIGN OUT NOTE  63M s/p R clavicle ORIF     -Dressing: gauze and tegaderm  -Skin closed with monocryl and dermabond  -Pain control as needed  -Complete perioperative abx while in house   -VTE ppx: SCDs, ASA 325mg daily x14 days, then continue home A81  -NWB in shoulder immobilizer  -OOB with assistance as needed  -PT  -Ice and elevate  -Encourage incentive spirometry  -Medical co-management appreciated  -DC planning: Home tonight vs tomorrow   -Discussed with attending who agrees with plan

## 2024-02-09 NOTE — ED ADULT TRIAGE NOTE - CHIEF COMPLAINT QUOTE
pt ambulatory to triage. pt has a broken clavicle, was told by MD Mariano to come to the ER to be admitted for surgery. pt has no complaints at this time. NKDA, PMH HTN.

## 2024-02-09 NOTE — ED ADULT NURSE REASSESSMENT NOTE - NS ED NURSE REASSESS COMMENT FT1
Pt received A+Ox4. VSS. Denies pain to right shoulder. Shoulder is swollen with resolving yellow bruising noted to chest wall. Ice pack applied as ordered. Pt instructed in plan of care. Wife at the bedside. Monitored closely. Maintained NPO fo OR. Call bell in reach.

## 2024-02-09 NOTE — PATIENT PROFILE ADULT - FALL HARM RISK - HARM RISK INTERVENTIONS

## 2024-02-09 NOTE — DISCHARGE NOTE PROVIDER - NS AS DC PROVIDER CONTACT Y/N MULTI
From: Ligia Pace  To: Maia Palacio MD  Sent: 5/23/2019 11:22 PM CDT  Subject: Medication Question    Could I please be put on a different blood pressure pill instead of olmesarten? I don't like the way it makes me feel. Thank you.   Yes

## 2024-02-09 NOTE — ED PROVIDER NOTE - PHYSICAL EXAMINATION
Constitutional: NAD   Eyes: PERRLA  Head: Normocephalic   Mouth: MMM  Cardiac: regular rate   Resp: Lungs CTAB  GI: Abd s/nt/nd, no rebound or guarding.  Neuro: awake, alert, moving all extremities  Skin: No rashes   Musculoskeletal: Ecchymosis along R anterior chest, R arm neurovascularly intact

## 2024-02-09 NOTE — ED PROVIDER NOTE - WR ORDER NAME 1
----- Message from Jayesh Moses MD sent at 12/7/2017  9:25 AM CST -----  Please notify patient that labs are normal except for mildly elevated glucose of 101. Work on low carb diet and more exercise. Recheck in 1 year  
Pt notified, verbalized understanding. Done.  
Xray Chest 1 View- PORTABLE-Urgent

## 2024-02-09 NOTE — ED ADULT NURSE NOTE - OBJECTIVE STATEMENT
pt is 64 yo male sent in by MD Mariano, pt reports he fell while he was snowboarding and injured his right clavicle.  +ecchymosis noted over the right ACW and shoulder.  Denies any pain, or NV. Last PO intake last night around 7pm.

## 2024-02-09 NOTE — PHARMACOTHERAPY INTERVENTION NOTE - COMMENTS
Medication reconciliation completed.  Reviewed Medication list and confirmed med allergies with patient; confirmed with Dr. First [place].

## 2024-02-09 NOTE — DISCHARGE NOTE PROVIDER - NSDCFUADDINST_GEN_ALL_CORE_FT
1. Pain control, a prescription was sent to your pharmacy  2. Non-weight bearing of the RUE in Shoulder Immobilizer  3. Physical therapy as needed  4. Follow up with Dr. Mariano as outpatient in 2 weeks after discharge from the hospital or rehab, call office for appointment  5. Keep dressing clean and dry  6. DVT prophylaxis: ASA  7. No baths/hot tubs or soaks.

## 2024-02-09 NOTE — DISCHARGE NOTE PROVIDER - NSDCFUSCHEDAPPT_GEN_ALL_CORE_FT
Eliezer Sanon  [facility] Physician [facility]  CARDIOLOGY [address]  Scheduled Appointment: 03/07/2024

## 2024-02-09 NOTE — ED ADULT NURSE NOTE - NSFALLHARMRISKINTERV_ED_ALL_ED

## 2024-02-09 NOTE — H&P ADULT - HISTORY OF PRESENT ILLNESS
Patient is a 63yMale RHD who presents to [facility] ED w/ a c/o of right clavicle pain. Patient states that he was snowboarding on 2/4 when he fell and injured his clavicle. Denies Head strike or LOC. States ability to walk immediately following the injury. Denies any numbness or tingling. Denies having any other pain elsewhere. Denies any previous orthopedic history. No other orthopedic concerns at this time.    HTN (hypertension)    HLD (hyperlipidemia)    AS (aortic stenosis)            No Known Allergies      PHYSICAL EXAM:  T(C): 36.8 (02-09-24 @ 07:03), Max: 36.8 (02-09-24 @ 07:03)  HR: 64 (02-09-24 @ 07:03) (64 - 64)  BP: 126/89 (02-09-24 @ 07:03) (126/89 - 126/89)  RR: 18 (02-09-24 @ 07:03) (18 - 18)  SpO2: 100% (02-09-24 @ 07:03) (100% - 100%)    Gen: NAD, Resting comfortably    RUE:  Skin: ecchymosis over the right clavicle with TTP  NTTP elsewhere on extremity  SILT C5-T1  Motor: +AIN/PIN/Uln/Med/Rad/Msc/Ax  Compartments soft and compressible  2+ RP    Secondary Survey:   RLE/LLE/LUE: No TTP over bony prominences, SILT, palpable pulses, full/painless range of motion, compartments soft    Spine: No bony tenderness. No palpable stepoffs.      A/P: 63 M who presents with a R clavicle fx    Plan for OR today with Dr. Mariano for R clavicle ORIF  Keep patient NPO for OR  Hold DVT ppx for OR today  Analgesia  NWB RUE in Sling  Ice and elevate as tolerated  Discussed with Dr. Mariano who is in agreement with above plan

## 2024-02-09 NOTE — DISCHARGE NOTE PROVIDER - HOSPITAL COURSE
The patient is a 63y Male status post R clavicle ORIF. Patient presented to [facility] after being medically cleared for an elective surgical procedure. The patient was taken to the operating room on date mentioned above. Prophylactic antibiotics were started before the procedure and continued for 24 hours. There were no complications during the procedure and patient tolerated the procedure well. The patient was transferred to the recovery room in stable condition and subsequently to the surgical floor. The patient was placed on Aspirin 325 mg for anticoagulation while in house. All home medications were continued. The patient received physical therapy daily and daily labs were followed. The dressing was kept clean, dry, intact. The rest of the hospital stay was unremarkable.

## 2024-02-10 ENCOUNTER — TRANSCRIPTION ENCOUNTER (OUTPATIENT)
Age: 64
End: 2024-02-10

## 2024-02-10 VITALS
HEART RATE: 56 BPM | OXYGEN SATURATION: 98 % | SYSTOLIC BLOOD PRESSURE: 123 MMHG | RESPIRATION RATE: 16 BRPM | TEMPERATURE: 98 F | DIASTOLIC BLOOD PRESSURE: 73 MMHG

## 2024-02-10 LAB
HCV AB S/CO SERPL IA: 0.11 S/CO — SIGNIFICANT CHANGE UP (ref 0–0.99)
HCV AB SERPL-IMP: SIGNIFICANT CHANGE UP

## 2024-02-10 RX ADMIN — Medication 650 MILLIGRAM(S): at 11:00

## 2024-02-10 RX ADMIN — Medication 2000 MILLIGRAM(S): at 11:00

## 2024-02-10 RX ADMIN — Medication 650 MILLIGRAM(S): at 00:35

## 2024-02-10 RX ADMIN — SODIUM CHLORIDE 125 MILLILITER(S): 9 INJECTION, SOLUTION INTRAVENOUS at 00:36

## 2024-02-10 RX ADMIN — Medication 325 MILLIGRAM(S): at 10:59

## 2024-02-10 RX ADMIN — Medication 650 MILLIGRAM(S): at 05:19

## 2024-02-10 RX ADMIN — Medication 2000 MILLIGRAM(S): at 00:35

## 2024-02-10 NOTE — DISCHARGE NOTE NURSING/CASE MANAGEMENT/SOCIAL WORK - NSDCPEFALRISK_GEN_ALL_CORE
For information on Fall & Injury Prevention, visit: [email or web address] OR  [email or web address] OR  https://www.cdc.gov/steadi/patient.html

## 2024-02-10 NOTE — PHYSICAL THERAPY INITIAL EVALUATION ADULT - PLANNED THERAPY INTERVENTIONS, PT EVAL
No skilled PT is needed in acute care setting. Patient Is independent with all functional mobility and ambulation

## 2024-02-10 NOTE — OCCUPATIONAL THERAPY INITIAL EVALUATION ADULT - ADL RETRAINING, OT EVAL
Pt will improve UB dressing to IND in 2 weeks. Pt will improve LB dressing to IND in 2 weeks. Pt will improve toileting to IND in 2 weeks. Pt will improve R hand grasp to 5/5 in 2 weeks

## 2024-02-10 NOTE — PROGRESS NOTE ADULT - SUBJECTIVE AND OBJECTIVE BOX
Postop Check    Patient tolerated the procedure well. Patient seen and examined at bedside. No acute complaints at this time. Pain well controlled. Denies chest pain, shortness of breath, nausea or vomiting.     Vital Signs (24 Hrs):  T(C): 36.8 (02-09-24 @ 20:06), Max: 36.9 (02-09-24 @ 14:34)  HR: 64 (02-09-24 @ 20:06) (49 - 64)  BP: 130/67 (02-09-24 @ 20:06) (114/77 - 140/73)  RR: 16 (02-09-24 @ 20:06) (14 - 18)  SpO2: 100% (02-09-24 @ 20:06) (97% - 100%)  Wt(kg): --    LABS:                          13.1   6.69  )-----------( 278      ( 09 Feb 2024 07:49 )             40.0     02-09    142  |  110<H>  |  16  ----------------------------<  109<H>  4.3   |  29  |  1.13    Ca    9.3      09 Feb 2024 07:49        PT/INR - ( 09 Feb 2024 07:49 )   PT: 9.7 sec;   INR: 0.85 ratio         PTT - ( 09 Feb 2024 07:49 )  PTT:26.6 sec    General: NAD, resting comfortably in bed  RUE:   Dressing C/D/I  Compartments soft and compressible  No calf tenderness bilaterally  +Axillary/Musculocutaneous/Radial/Median/AIN/PIN intact  SILT C5-T1  2+ radial pulses    A/P:  63y m s/p R clavicle ORIF POD0  -PT/OT  -NWB RUE in SI  -Pain Control  -DVT ppx starting POD1  -Continue perioperative abx x 24 hours  -FU AM Labs  -Rest, ice, compress and elevate the extremity as we needed  -Incentive Spirometry  -Medical management appreciated  -Will discuss plan with Dr. Mariano and will advise changes to plan as needed
Patient seen and examined at bedside. Pain well controlled with medication. Patient denies any numbness, tingling, weakness, or any other orthopaedic complaint. Denies N/V/CP/SOB.       VITAL SIGNS:  T(C): 36.9 (02-10-24 @ 08:00), Max: 36.9 (02-09-24 @ 14:34)  HR: 53 (02-10-24 @ 08:00) (49 - 74)  BP: 110/59 (02-10-24 @ 08:00) (103/60 - 140/73)  RR: 16 (02-10-24 @ 08:00) (14 - 16)  SpO2: 93% (02-10-24 @ 08:00) (93% - 100%)      LABS:                        13.1   6.69  )-----------( 278      ( 09 Feb 2024 07:49 )             40.0     02-09    142  |  110<H>  |  16  ----------------------------<  109<H>  4.3   |  29  |  1.13    Ca    9.3      09 Feb 2024 07:49      PT/INR - ( 09 Feb 2024 07:49 )   PT: 9.7 sec;   INR: 0.85 ratio         PTT - ( 09 Feb 2024 07:49 )  PTT:26.6 sec  Urinalysis Basic - ( 09 Feb 2024 07:49 )    Color: x / Appearance: x / SG: x / pH: x  Gluc: 109 mg/dL / Ketone: x  / Bili: x / Urobili: x   Blood: x / Protein: x / Nitrite: x   Leuk Esterase: x / RBC: x / WBC x   Sq Epi: x / Non Sq Epi: x / Bacteria: x      General: NAD, resting comfortably in bed  RUE:   Dressing C/D/I  Compartments soft and compressible  No calf tenderness bilaterally  +Axillary/Musculocutaneous/Radial/Median/AIN/PIN intact  SILT C5-T1  2+ radial pulses    A/P:  63y m s/p R clavicle ORIF POD1  -PT/OT  -NWB RUE in SI  -Pain Control  -DVT ppx starting POD1  -Continue perioperative abx x 24 hours  -FU AM Labs  -Rest, ice, compress and elevate the extremity as we needed  -Incentive Spirometry  -Medical management appreciated  -Will discuss plan with Dr. Mariano and will advise changes to plan as needed  Home today

## 2024-02-10 NOTE — OCCUPATIONAL THERAPY INITIAL EVALUATION ADULT - NSACTIVITYREC_GEN_A_OT
Patient educated on RUE  precautions and how to participate in dressing tasks, grooming tasks, toileting tasks, bed mobility and  all functional transfers while abiding by precautions. Handout provided to promote carryover. Pt and pt's wife educated throughout for carryover

## 2024-02-10 NOTE — OCCUPATIONAL THERAPY INITIAL EVALUATION ADULT - LIVES WITH, PROFILE
private house with one step, 10-12 steps inside, tub shower, comfort height toilet, GB's, IND prior/spouse

## 2024-02-10 NOTE — PHYSICAL THERAPY INITIAL EVALUATION ADULT - RANGE OF MOTION EXAMINATION, REHAB EVAL
except right shoulder in sling/bilateral upper extremity ROM was WFL (within functional limits)/bilateral lower extremity ROM was WFL (within functional limits)/deficits as listed below

## 2024-02-10 NOTE — PHYSICAL THERAPY INITIAL EVALUATION ADULT - DIAGNOSIS, PT EVAL
right shoulder limitation S/P ORIF right clavicle, following clavicular fracture 2/4 due to fall snowboarding

## 2024-02-10 NOTE — PHYSICAL THERAPY INITIAL EVALUATION ADULT - ACTIVE RANGE OF MOTION EXAMINATION, REHAB EVAL
except right shoulder in sling/bilateral upper extremity Active ROM was WFL (within functional limits)/bilateral  lower extremity Active ROM was WFL (within functional limits)

## 2024-02-10 NOTE — DISCHARGE NOTE NURSING/CASE MANAGEMENT/SOCIAL WORK - PATIENT PORTAL LINK FT
You can access the FollowMyHealth Patient Portal offered by [facility] by registering at the following website: [email or web address]. By joining [facility]’s FollowMyHealth portal, you will also be able to view your health information using other applications (apps) compatible with our system.

## 2024-02-10 NOTE — PHYSICAL THERAPY INITIAL EVALUATION ADULT - REHAB POTENTIAL, PT EVAL
Patient Is independent with all functional mobility and ambulation/good, to achieve stated therapy goals

## 2024-02-10 NOTE — PHYSICAL THERAPY INITIAL EVALUATION ADULT - PASSIVE RANGE OF MOTION EXAMINATION, REHAB EVAL
except right shoulder in sling/bilateral upper extremity Passive ROM was WFL (within functional limits)/bilateral lower extremity Passive ROM was WFL (within functional limits)
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
## 2024-02-15 DIAGNOSIS — Z95.2 PRESENCE OF PROSTHETIC HEART VALVE: ICD-10-CM

## 2024-02-15 DIAGNOSIS — Z79.82 LONG TERM (CURRENT) USE OF ASPIRIN: ICD-10-CM

## 2024-02-15 DIAGNOSIS — Z79.899 OTHER LONG TERM (CURRENT) DRUG THERAPY: ICD-10-CM

## 2024-02-15 DIAGNOSIS — E78.5 HYPERLIPIDEMIA, UNSPECIFIED: ICD-10-CM

## 2024-02-15 DIAGNOSIS — I10 ESSENTIAL (PRIMARY) HYPERTENSION: ICD-10-CM
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
## 2024-02-15 DIAGNOSIS — S42.021A DISPLACED FRACTURE OF SHAFT OF RIGHT CLAVICLE, INITIAL ENCOUNTER FOR CLOSED FRACTURE: ICD-10-CM

## 2024-02-22 ENCOUNTER — APPOINTMENT (OUTPATIENT)
Dept: ORTHOPEDIC SURGERY | Facility: CLINIC | Age: 64
End: 2024-02-22
Payer: COMMERCIAL

## 2024-02-22 VITALS — WEIGHT: 160 LBS | HEIGHT: 67 IN | BODY MASS INDEX: 25.11 KG/M2

## 2024-02-22 PROCEDURE — 99024 POSTOP FOLLOW-UP VISIT: CPT

## 2024-02-22 PROCEDURE — 73000 X-RAY EXAM OF COLLAR BONE: CPT | Mod: RT

## 2024-02-23 NOTE — HISTORY OF PRESENT ILLNESS
[] : Post Surgical Visit: yes [Meds] : meds [Rest] : rest [FreeTextEntry5] : 64 y/o M presents for PO #1 eval of the Rt. clavicle. s/p Rt. clavicle ORIF on DOS noted above. Pt reports recovery is going well with minimal pain. Denies any N/T.  [de-identified] : 2/9/24 [de-identified] : Rt. Clavicle ORIF

## 2024-02-23 NOTE — ASSESSMENT
[FreeTextEntry1] : The patient is 2 weeks s/p a right clavicle ORIF on 2/9/24. The patient denies fever, chills, CP, SOB. The patient denies drainage or discharge from their incision. The patient is doing well postoperatively. The patient has not needed pain medications. They deny new trauma or paresthesias. The patient can complete ADLs. He presents in sling. He has been completing minimal home exercises. He is otherwise doing well.   Right clavicle exam: The patient presents with no apparent distress. Neurovascularly intact. Sensation intact to right upper extremity. Operative dressing removed. Incision is healing appropriately. Tender to palpation to the upper chest and pec muscles. No TTP to shoulder. No ROM or strength tested.  Right clavicle xrays taken today in office, 2 views NWB: ORIF hardware intact without movement or loosening. Fractures are appropriately secured. No obvious tumors, masses, or lesions.   The patient will wear sling for two more weeks. He can start pendulums and elbow/wrist/hand exercises. He will return in 2 weeks with Dr. Mariano.

## 2024-03-04 ENCOUNTER — APPOINTMENT (OUTPATIENT)
Dept: ORTHOPEDIC SURGERY | Facility: CLINIC | Age: 64
End: 2024-03-04
Payer: COMMERCIAL

## 2024-03-04 VITALS — WEIGHT: 160 LBS | BODY MASS INDEX: 25.11 KG/M2 | HEIGHT: 67 IN

## 2024-03-04 PROCEDURE — 99024 POSTOP FOLLOW-UP VISIT: CPT

## 2024-03-05 NOTE — ASSESSMENT
[FreeTextEntry1] : The patient comes in today for follow-up on his right shoulder.  Is now 4 weeks out from his open reduction internal fixation of right clavicle fracture.  Overall he is doing well.  He has been wearing the sling diligently.  He unfortunately has some stiffness in the arm at this point.  But he has no pain at the fracture site.  Exam of the right shoulder reveals well-healed scar in the clavicle.  There is some numbness area around the scar.  He has forward elevation to about 110 degrees external rotation 60 degrees and internal rotation to the posterior superior iliac spine.  His elbow motion is normal.  Plan at this time is to start physical therapy.  Will discontinue the sling and start home exercises.  He will try to stay away from weighted exercises and repetitive exercises until I see him back again.  Will see him back in the office in a month with x-rays 2 views of the right clavicle.

## 2024-03-05 NOTE — HISTORY OF PRESENT ILLNESS
[1] : 2 [Rest] : rest [Meds] : meds [] : Post Surgical Visit: yes [FreeTextEntry5] : 62 y/o M presents for PO #2 eval of the Rt. clavicle. s/p Rt. clavicle ORIF on DOS noted above. Pt reports recovery is going well with little to no pain. States he has not started any PT. He comes in a sling today.  [FreeTextEntry9] : oxy [de-identified] : 2/9/24 [de-identified] : 2/9/24 [de-identified] : Rt. Clavicle ORIF Alert and oriented to person, place and time

## 2024-03-07 ENCOUNTER — APPOINTMENT (OUTPATIENT)
Dept: CARDIOLOGY | Facility: CLINIC | Age: 64
End: 2024-03-07
Payer: COMMERCIAL

## 2024-03-07 ENCOUNTER — NON-APPOINTMENT (OUTPATIENT)
Age: 64
End: 2024-03-07

## 2024-03-07 VITALS
HEIGHT: 60 IN | HEART RATE: 79 BPM | WEIGHT: 157 LBS | SYSTOLIC BLOOD PRESSURE: 120 MMHG | OXYGEN SATURATION: 98 % | BODY MASS INDEX: 30.82 KG/M2 | DIASTOLIC BLOOD PRESSURE: 79 MMHG

## 2024-03-07 DIAGNOSIS — Q23.1 CONGENITAL INSUFFICIENCY OF AORTIC VALVE: ICD-10-CM

## 2024-03-07 PROCEDURE — 99214 OFFICE O/P EST MOD 30 MIN: CPT

## 2024-03-07 PROCEDURE — 93000 ELECTROCARDIOGRAM COMPLETE: CPT

## 2024-03-07 NOTE — REASON FOR VISIT
[Follow-Up - Clinic] : a clinic follow-up of [Aortic Stenosis] : aortic stenosis [Hypertension] : hypertension [FreeTextEntry1] : no sscp or benites [FreeTextEntry2] : s/p AVR, aorta surgery, no cardiac sx, s/p right shoulder surgery

## 2024-03-07 NOTE — DISCUSSION/SUMMARY
[Stable] : stable [GERD] : gastroesophageal reflux disease [Musculoskeletal Chest Pain] : musculoskeletal chest pain [Echocardiogram] : echocardiogram [Hyperlipidemia] : hyperlipidemia [Not Responding to Treatment] : not responding to treatment [Diet Modification] : diet modification [Exercise] : exercise [Hypertension] : hypertension [Responding to Treatment] : responding to treatment [Improving] : improving [de-identified] : toprol xl 100 am, 50 pm [de-identified] : dizziness

## 2024-03-07 NOTE — PHYSICAL EXAM
[Normal Appearance] : normal appearance [General Appearance - Well Developed] : well developed [Well Groomed] : well groomed [General Appearance - Well Nourished] : well nourished [No Deformities] : no deformities [General Appearance - In No Acute Distress] : no acute distress [Normal Conjunctiva] : the conjunctiva exhibited no abnormalities [Eyelids - No Xanthelasma] : the eyelids demonstrated no xanthelasmas [Normal Oral Mucosa] : normal oral mucosa [No Oral Pallor] : no oral pallor [No Oral Cyanosis] : no oral cyanosis [Normal Jugular Venous A Waves Present] : normal jugular venous A waves present [Normal Jugular Venous V Waves Present] : normal jugular venous V waves present [No Jugular Venous Freeman A Waves] : no jugular venous freeman A waves [FreeTextEntry1] : left leg sciatica, recent steroid rx, cant walk on treadmill [Nail Clubbing] : no clubbing of the fingernails [Cyanosis, Localized] : no localized cyanosis [Petechial Hemorrhages (___cm)] : no petechial hemorrhages [] : no rash [Skin Color & Pigmentation] : normal skin color and pigmentation [No Skin Ulcers] : no skin ulcer [Skin Lesions] : no skin lesions [No Venous Stasis] : no venous stasis [Oriented To Time, Place, And Person] : oriented to person, place, and time [No Xanthoma] : no  xanthoma was observed [Mood] : the mood was normal [Affect] : the affect was normal [No Anxiety] : not feeling anxious

## 2024-04-03 ENCOUNTER — APPOINTMENT (OUTPATIENT)
Dept: ORTHOPEDIC SURGERY | Facility: CLINIC | Age: 64
End: 2024-04-03
Payer: COMMERCIAL

## 2024-04-03 VITALS — WEIGHT: 157 LBS | HEIGHT: 60 IN | BODY MASS INDEX: 30.82 KG/M2

## 2024-04-03 PROCEDURE — 99024 POSTOP FOLLOW-UP VISIT: CPT

## 2024-04-03 NOTE — ASSESSMENT
[FreeTextEntry1] : The patient comes in today for follow-up on his right clavicle.  He is now 7 weeks out from his open reduction internal fixation and doing beautifully.  His only problem is still some stiffness in the right shoulder.  He has no pain at the fracture site.  He continues to do physical therapy and home exercises.  Examination of the right upper extremity reveals normal neurovascular exam.  His scar of the clavicle is well-healed there is no pain to palpation.  He has significant stiffness of his right shoulder with forward elevation of 150 external rotation to 60 and internal rotation to L1.  There is 5 out of 5 strength of deltoid and rotator cuff.  There is no instability or apprehension.  The plan at this time is continue physical therapy being more aggressive with his range of motion.  Will see him back in the office in 2 months.

## 2024-04-03 NOTE — HISTORY OF PRESENT ILLNESS
[Rest] : rest [1] : 2 [Meds] : meds [] : Post Surgical Visit: yes [FreeTextEntry5] : 62 y/o M presents for PO #3 eval of the Rt. clavicle. s/p Rt. clavicle ORIF on DOS noted above. Pt reports recovery is going well with little to no pain. PT 2x a week for a month [FreeTextEntry9] : oxy [de-identified] : 2/9/24 [de-identified] : 2/9/24 [de-identified] : Rt. Clavicle ORIF

## 2024-04-15 ENCOUNTER — TRANSCRIPTION ENCOUNTER (OUTPATIENT)
Age: 64
End: 2024-04-15

## 2024-04-15 RX ORDER — AMLODIPINE BESYLATE 10 MG/1
10 TABLET ORAL DAILY
Qty: 90 | Refills: 0 | Status: ACTIVE | COMMUNITY
Start: 2017-11-14 | End: 1900-01-01

## 2024-04-15 RX ORDER — METOPROLOL SUCCINATE 100 MG/1
100 TABLET, EXTENDED RELEASE ORAL
Qty: 90 | Refills: 0 | Status: ACTIVE | COMMUNITY
Start: 2018-04-25 | End: 1900-01-01

## 2024-06-03 ENCOUNTER — APPOINTMENT (OUTPATIENT)
Dept: ORTHOPEDIC SURGERY | Facility: CLINIC | Age: 64
End: 2024-06-03
Payer: COMMERCIAL

## 2024-06-03 VITALS — WEIGHT: 157 LBS | BODY MASS INDEX: 30.82 KG/M2 | HEIGHT: 60 IN

## 2024-06-03 DIAGNOSIS — S42.021A DISPLACED FRACTURE OF SHAFT OF RIGHT CLAVICLE, INITIAL ENCOUNTER FOR CLOSED FRACTURE: ICD-10-CM

## 2024-06-03 PROCEDURE — 99213 OFFICE O/P EST LOW 20 MIN: CPT

## 2024-06-03 PROCEDURE — 73000 X-RAY EXAM OF COLLAR BONE: CPT | Mod: RT

## 2024-06-04 NOTE — HISTORY OF PRESENT ILLNESS
[1] : 2 [Rest] : rest [Meds] : meds [] : Post Surgical Visit: yes [FreeTextEntry5] : 62 y/o M presents for FU eval of the Rt. clavicle. s/p Rt. clavicle ORIF on DOS noted above. Pt reports recovery is going well with little to no pain and improved ROM. PT 2x a week.  [FreeTextEntry9] : oxy [de-identified] : 2/9/24 [de-identified] : 2/9/24 [de-identified] : Rt. Clavicle ORIF

## 2024-06-04 NOTE — ASSESSMENT
[FreeTextEntry1] : The patient is approx. 4 months out from his open reduction internal fixation of his right clavicle on 2/9/24. The patient has stiffness with forward elevation and reaching overhead. He feels he is plateauing with PT and will switch to HEP. He has not needed pain medications. He denies new trauma. He denies paresthesias. He has slight soreness at the proximal end of clavicle at the end of the ORIF plate.   Right shoulder exam: Neurovascularly intact. Sensation intact. His scar of the clavicle is well-healed there is no pain to palpation.  He has significant stiffness of his right shoulder with forward elevation of 150 external rotation to 60 and internal rotation to L1.  There is 5 out of 5 strength of deltoid and rotator cuff.  There is no instability or apprehension.  X-rays done in the office today of the right clavicle 2 views show the fracture to be healed in anatomic position with appropriate position of the hardware.  There is no evidence of injury or displacement of the sternoclavicular joint or AC joint.  There are no obvious tumors, masses or calcifications seen.  Plan at this time is to resume activities as tolerated.  He will continue to work on stretching to regain the rest of his range of motion.  I will see him back in the office as needed.  He had a discussion today regarding the possibility of removing the hardware.  At the present time is still bothering him to the touch but we will see how it goes in the future.  Will see him back as necessary.

## 2024-08-01 ENCOUNTER — APPOINTMENT (OUTPATIENT)
Dept: CARDIOLOGY | Facility: CLINIC | Age: 64
End: 2024-08-01
Payer: COMMERCIAL

## 2024-08-01 PROCEDURE — 93306 TTE W/DOPPLER COMPLETE: CPT
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
## 2024-10-30 ENCOUNTER — RX RENEWAL (OUTPATIENT)
Age: 64
End: 2024-10-30

## 2025-02-10 ENCOUNTER — APPOINTMENT (OUTPATIENT)
Dept: CARDIOLOGY | Facility: CLINIC | Age: 65
End: 2025-02-10